# Patient Record
Sex: MALE | Race: WHITE | Employment: UNEMPLOYED | URBAN - METROPOLITAN AREA
[De-identification: names, ages, dates, MRNs, and addresses within clinical notes are randomized per-mention and may not be internally consistent; named-entity substitution may affect disease eponyms.]

---

## 2021-03-07 ENCOUNTER — HOSPITAL ENCOUNTER (OUTPATIENT)
Age: 26
Setting detail: OBSERVATION
Discharge: HOME | End: 2021-03-08
Attending: EMERGENCY MEDICINE | Admitting: STUDENT IN AN ORGANIZED HEALTH CARE EDUCATION/TRAINING PROGRAM
Payer: MEDICAID

## 2021-03-07 ENCOUNTER — APPOINTMENT (OUTPATIENT)
Dept: CT IMAGING | Age: 26
End: 2021-03-07
Attending: INTERNAL MEDICINE
Payer: MEDICAID

## 2021-03-07 ENCOUNTER — APPOINTMENT (OUTPATIENT)
Dept: GENERAL RADIOLOGY | Age: 26
End: 2021-03-07
Attending: EMERGENCY MEDICINE
Payer: MEDICAID

## 2021-03-07 ENCOUNTER — APPOINTMENT (OUTPATIENT)
Dept: CT IMAGING | Age: 26
End: 2021-03-07
Attending: EMERGENCY MEDICINE
Payer: MEDICAID

## 2021-03-07 DIAGNOSIS — B34.9 VIRAL SYNDROME: ICD-10-CM

## 2021-03-07 DIAGNOSIS — K75.9 HEPATITIS: ICD-10-CM

## 2021-03-07 DIAGNOSIS — I16.0 HYPERTENSIVE URGENCY: Primary | ICD-10-CM

## 2021-03-07 PROBLEM — U07.1 COVID-19: Status: ACTIVE | Noted: 2021-03-07

## 2021-03-07 PROBLEM — D72.820 LYMPHOCYTOSIS: Status: ACTIVE | Noted: 2021-03-07

## 2021-03-07 LAB
ALBUMIN SERPL-MCNC: 3.4 G/DL (ref 3.5–5)
ALBUMIN/GLOB SERPL: 0.9 {RATIO} (ref 1.1–2.2)
ALP SERPL-CCNC: 106 U/L (ref 45–117)
ALT SERPL-CCNC: 141 U/L (ref 12–78)
AMPHET UR QL SCN: NEGATIVE
ANION GAP SERPL CALC-SCNC: 11 MMOL/L (ref 5–15)
APPEARANCE UR: CLEAR
AST SERPL-CCNC: 107 U/L (ref 15–37)
B PERT DNA SPEC QL NAA+PROBE: NOT DETECTED
BACTERIA URNS QL MICRO: NEGATIVE /HPF
BARBITURATES UR QL SCN: NEGATIVE
BASOPHILS # BLD: 0 K/UL (ref 0–0.1)
BASOPHILS NFR BLD: 0 % (ref 0–1)
BENZODIAZ UR QL: NEGATIVE
BILIRUB SERPL-MCNC: 1.2 MG/DL (ref 0.2–1)
BILIRUB UR QL CFM: NEGATIVE
BNP SERPL-MCNC: 44 PG/ML (ref 0–125)
BORDETELLA PARAPERTUSSIS PCR, BORPAR: NOT DETECTED
BUN SERPL-MCNC: 11 MG/DL (ref 6–20)
BUN/CREAT SERPL: 9 (ref 12–20)
C PNEUM DNA SPEC QL NAA+PROBE: NOT DETECTED
CALCIUM SERPL-MCNC: 8.5 MG/DL (ref 8.5–10.1)
CANNABINOIDS UR QL SCN: NEGATIVE
CHLORIDE SERPL-SCNC: 100 MMOL/L (ref 97–108)
CO2 SERPL-SCNC: 27 MMOL/L (ref 21–32)
COCAINE UR QL SCN: NEGATIVE
COLOR UR: ABNORMAL
COMMENT, HOLDF: NORMAL
COVID-19 RAPID TEST, COVR: DETECTED
CREAT SERPL-MCNC: 1.18 MG/DL (ref 0.7–1.3)
CRP SERPL-MCNC: 1.66 MG/DL (ref 0–0.6)
D DIMER PPP FEU-MCNC: 1.38 MG/L FEU (ref 0–0.65)
DEPRECATED S PYO AG THROAT QL EIA: NEGATIVE
DIFFERENTIAL METHOD BLD: ABNORMAL
DRUG SCRN COMMENT,DRGCM: NORMAL
EOSINOPHIL # BLD: 0 K/UL (ref 0–0.4)
EOSINOPHIL NFR BLD: 0 % (ref 0–7)
EPITH CASTS URNS QL MICRO: ABNORMAL /LPF
ERYTHROCYTE [DISTWIDTH] IN BLOOD BY AUTOMATED COUNT: 13 % (ref 11.5–14.5)
ERYTHROCYTE [SEDIMENTATION RATE] IN BLOOD: 8 MM/HR (ref 0–15)
FERRITIN SERPL-MCNC: 474 NG/ML (ref 26–388)
FIBRINOGEN PPP-MCNC: 283 MG/DL (ref 200–475)
FLUAV AG NPH QL IA: NEGATIVE
FLUAV H1 2009 PAND RNA SPEC QL NAA+PROBE: NOT DETECTED
FLUAV H1 RNA SPEC QL NAA+PROBE: NOT DETECTED
FLUAV H3 RNA SPEC QL NAA+PROBE: NOT DETECTED
FLUAV SUBTYP SPEC NAA+PROBE: NOT DETECTED
FLUBV AG NOSE QL IA: NEGATIVE
FLUBV RNA SPEC QL NAA+PROBE: NOT DETECTED
GLOBULIN SER CALC-MCNC: 3.6 G/DL (ref 2–4)
GLUCOSE SERPL-MCNC: 127 MG/DL (ref 65–100)
GLUCOSE UR STRIP.AUTO-MCNC: NEGATIVE MG/DL
HADV DNA SPEC QL NAA+PROBE: NOT DETECTED
HCOV 229E RNA SPEC QL NAA+PROBE: NOT DETECTED
HCOV HKU1 RNA SPEC QL NAA+PROBE: NOT DETECTED
HCOV NL63 RNA SPEC QL NAA+PROBE: NOT DETECTED
HCOV OC43 RNA SPEC QL NAA+PROBE: NOT DETECTED
HCT VFR BLD AUTO: 40.9 % (ref 36.6–50.3)
HETEROPH AB BLD QL IA: POSITIVE
HGB BLD-MCNC: 14.8 G/DL (ref 12.1–17)
HGB UR QL STRIP: ABNORMAL
HMPV RNA SPEC QL NAA+PROBE: NOT DETECTED
HPIV1 RNA SPEC QL NAA+PROBE: NOT DETECTED
HPIV2 RNA SPEC QL NAA+PROBE: NOT DETECTED
HPIV3 RNA SPEC QL NAA+PROBE: NOT DETECTED
HPIV4 RNA SPEC QL NAA+PROBE: NOT DETECTED
HYALINE CASTS URNS QL MICRO: ABNORMAL /LPF (ref 0–5)
IMM GRANULOCYTES # BLD AUTO: 0 K/UL
IMM GRANULOCYTES NFR BLD AUTO: 0 %
INR PPP: 1 (ref 0.9–1.1)
KETONES UR QL STRIP.AUTO: 15 MG/DL
LACTATE SERPL-SCNC: 1.3 MMOL/L (ref 0.4–2)
LDH SERPL L TO P-CCNC: 556 U/L (ref 85–241)
LEUKOCYTE ESTERASE UR QL STRIP.AUTO: NEGATIVE
LIPASE SERPL-CCNC: 74 U/L (ref 73–393)
LYMPHOCYTES # BLD: 10 K/UL (ref 0.8–3.5)
LYMPHOCYTES NFR BLD: 70 % (ref 12–49)
M PNEUMO DNA SPEC QL NAA+PROBE: NOT DETECTED
MCH RBC QN AUTO: 31 PG (ref 26–34)
MCHC RBC AUTO-ENTMCNC: 36.2 G/DL (ref 30–36.5)
MCV RBC AUTO: 85.6 FL (ref 80–99)
METHADONE UR QL: NEGATIVE
MONOCYTES # BLD: 0.1 K/UL (ref 0–1)
MONOCYTES NFR BLD: 1 % (ref 5–13)
NEUTS SEG # BLD: 4.1 K/UL (ref 1.8–8)
NEUTS SEG NFR BLD: 29 % (ref 32–75)
NITRITE UR QL STRIP.AUTO: NEGATIVE
NRBC # BLD: 0 K/UL (ref 0–0.01)
NRBC BLD-RTO: 0 PER 100 WBC
OPIATES UR QL: NEGATIVE
PATH REV BLD -IMP: NORMAL
PCP UR QL: NEGATIVE
PH UR STRIP: 6 [PH] (ref 5–8)
PLATELET # BLD AUTO: 363 K/UL (ref 150–400)
PMV BLD AUTO: 9.7 FL (ref 8.9–12.9)
POTASSIUM SERPL-SCNC: 3.6 MMOL/L (ref 3.5–5.1)
PROCALCITONIN SERPL-MCNC: 0.3 NG/ML
PROT SERPL-MCNC: 7 G/DL (ref 6.4–8.2)
PROT UR STRIP-MCNC: 100 MG/DL
PROTHROMBIN TIME: 10.4 SEC (ref 9–11.1)
RBC # BLD AUTO: 4.78 M/UL (ref 4.1–5.7)
RBC #/AREA URNS HPF: ABNORMAL /HPF (ref 0–5)
RBC MORPH BLD: ABNORMAL
RSV RNA SPEC QL NAA+PROBE: NOT DETECTED
RV+EV RNA SPEC QL NAA+PROBE: NOT DETECTED
SAMPLES BEING HELD,HOLD: NORMAL
SARS-COV-2 PCR, COVPCR: NOT DETECTED
SODIUM SERPL-SCNC: 138 MMOL/L (ref 136–145)
SOURCE, COVRS: ABNORMAL
SP GR UR REFRACTOMETRY: 1.01 (ref 1–1.03)
TROPONIN I SERPL-MCNC: <0.05 NG/ML
TSH SERPL DL<=0.05 MIU/L-ACNC: 0.86 UIU/ML (ref 0.36–3.74)
UA: UC IF INDICATED,UAUC: ABNORMAL
UROBILINOGEN UR QL STRIP.AUTO: 2 EU/DL (ref 0.2–1)
WBC # BLD AUTO: 14.2 K/UL (ref 4.1–11.1)
WBC CASTS URNS QL MICRO: ABNORMAL /LPF
WBC MORPH BLD: ABNORMAL
WBC URNS QL MICRO: ABNORMAL /HPF (ref 0–4)

## 2021-03-07 PROCEDURE — 83690 ASSAY OF LIPASE: CPT

## 2021-03-07 PROCEDURE — 80053 COMPREHEN METABOLIC PANEL: CPT

## 2021-03-07 PROCEDURE — 74011250637 HC RX REV CODE- 250/637: Performed by: INTERNAL MEDICINE

## 2021-03-07 PROCEDURE — 84145 PROCALCITONIN (PCT): CPT

## 2021-03-07 PROCEDURE — 93005 ELECTROCARDIOGRAM TRACING: CPT

## 2021-03-07 PROCEDURE — 86308 HETEROPHILE ANTIBODY SCREEN: CPT

## 2021-03-07 PROCEDURE — 85652 RBC SED RATE AUTOMATED: CPT

## 2021-03-07 PROCEDURE — 87389 HIV-1 AG W/HIV-1&-2 AB AG IA: CPT

## 2021-03-07 PROCEDURE — 83605 ASSAY OF LACTIC ACID: CPT

## 2021-03-07 PROCEDURE — 85384 FIBRINOGEN ACTIVITY: CPT

## 2021-03-07 PROCEDURE — 86140 C-REACTIVE PROTEIN: CPT

## 2021-03-07 PROCEDURE — 85610 PROTHROMBIN TIME: CPT

## 2021-03-07 PROCEDURE — 87804 INFLUENZA ASSAY W/OPTIC: CPT

## 2021-03-07 PROCEDURE — 99285 EMERGENCY DEPT VISIT HI MDM: CPT

## 2021-03-07 PROCEDURE — 82728 ASSAY OF FERRITIN: CPT

## 2021-03-07 PROCEDURE — 36415 COLL VENOUS BLD VENIPUNCTURE: CPT

## 2021-03-07 PROCEDURE — 71275 CT ANGIOGRAPHY CHEST: CPT

## 2021-03-07 PROCEDURE — 81001 URINALYSIS AUTO W/SCOPE: CPT

## 2021-03-07 PROCEDURE — 99218 HC RM OBSERVATION: CPT

## 2021-03-07 PROCEDURE — 80307 DRUG TEST PRSMV CHEM ANLYZR: CPT

## 2021-03-07 PROCEDURE — 86611 BARTONELLA ANTIBODY: CPT

## 2021-03-07 PROCEDURE — 80074 ACUTE HEPATITIS PANEL: CPT

## 2021-03-07 PROCEDURE — 87040 BLOOD CULTURE FOR BACTERIA: CPT

## 2021-03-07 PROCEDURE — 74011250637 HC RX REV CODE- 250/637: Performed by: STUDENT IN AN ORGANIZED HEALTH CARE EDUCATION/TRAINING PROGRAM

## 2021-03-07 PROCEDURE — 85379 FIBRIN DEGRADATION QUANT: CPT

## 2021-03-07 PROCEDURE — 83880 ASSAY OF NATRIURETIC PEPTIDE: CPT

## 2021-03-07 PROCEDURE — 87880 STREP A ASSAY W/OPTIC: CPT

## 2021-03-07 PROCEDURE — 85025 COMPLETE CBC W/AUTO DIFF WBC: CPT

## 2021-03-07 PROCEDURE — 96360 HYDRATION IV INFUSION INIT: CPT

## 2021-03-07 PROCEDURE — 74011250636 HC RX REV CODE- 250/636: Performed by: EMERGENCY MEDICINE

## 2021-03-07 PROCEDURE — 74011000636 HC RX REV CODE- 636

## 2021-03-07 PROCEDURE — 83615 LACTATE (LD) (LDH) ENZYME: CPT

## 2021-03-07 PROCEDURE — 87070 CULTURE OTHR SPECIMN AEROBIC: CPT

## 2021-03-07 PROCEDURE — 74176 CT ABD & PELVIS W/O CONTRAST: CPT

## 2021-03-07 PROCEDURE — 87635 SARS-COV-2 COVID-19 AMP PRB: CPT

## 2021-03-07 PROCEDURE — 84484 ASSAY OF TROPONIN QUANT: CPT

## 2021-03-07 PROCEDURE — 71045 X-RAY EXAM CHEST 1 VIEW: CPT

## 2021-03-07 PROCEDURE — 70450 CT HEAD/BRAIN W/O DYE: CPT

## 2021-03-07 PROCEDURE — 84443 ASSAY THYROID STIM HORMONE: CPT

## 2021-03-07 PROCEDURE — 0202U NFCT DS 22 TRGT SARS-COV-2: CPT

## 2021-03-07 RX ORDER — SODIUM CHLORIDE 0.9 % (FLUSH) 0.9 %
5-40 SYRINGE (ML) INJECTION EVERY 8 HOURS
Status: DISCONTINUED | OUTPATIENT
Start: 2021-03-07 | End: 2021-03-08 | Stop reason: HOSPADM

## 2021-03-07 RX ORDER — ZINC SULFATE 50(220)MG
1 CAPSULE ORAL DAILY
Status: DISCONTINUED | OUTPATIENT
Start: 2021-03-08 | End: 2021-03-08

## 2021-03-07 RX ORDER — ACETAMINOPHEN 650 MG/1
650 SUPPOSITORY RECTAL
Status: DISCONTINUED | OUTPATIENT
Start: 2021-03-07 | End: 2021-03-08 | Stop reason: HOSPADM

## 2021-03-07 RX ORDER — GUAIFENESIN 600 MG/1
600 TABLET, EXTENDED RELEASE ORAL EVERY 12 HOURS
Status: DISCONTINUED | OUTPATIENT
Start: 2021-03-07 | End: 2021-03-08 | Stop reason: HOSPADM

## 2021-03-07 RX ORDER — SODIUM CHLORIDE 0.9 % (FLUSH) 0.9 %
5-40 SYRINGE (ML) INJECTION AS NEEDED
Status: DISCONTINUED | OUTPATIENT
Start: 2021-03-07 | End: 2021-03-08 | Stop reason: HOSPADM

## 2021-03-07 RX ORDER — HYDRALAZINE HYDROCHLORIDE 20 MG/ML
10 INJECTION INTRAMUSCULAR; INTRAVENOUS
Status: DISCONTINUED | OUTPATIENT
Start: 2021-03-07 | End: 2021-03-08 | Stop reason: HOSPADM

## 2021-03-07 RX ORDER — ONDANSETRON 2 MG/ML
4 INJECTION INTRAMUSCULAR; INTRAVENOUS
Status: DISCONTINUED | OUTPATIENT
Start: 2021-03-07 | End: 2021-03-08 | Stop reason: HOSPADM

## 2021-03-07 RX ORDER — ACETAMINOPHEN 325 MG/1
650 TABLET ORAL
Status: DISCONTINUED | OUTPATIENT
Start: 2021-03-07 | End: 2021-03-08 | Stop reason: HOSPADM

## 2021-03-07 RX ORDER — ASCORBIC ACID 500 MG
500 TABLET ORAL 2 TIMES DAILY
Status: DISCONTINUED | OUTPATIENT
Start: 2021-03-07 | End: 2021-03-08

## 2021-03-07 RX ORDER — LISINOPRIL 5 MG/1
10 TABLET ORAL
Status: COMPLETED | OUTPATIENT
Start: 2021-03-07 | End: 2021-03-07

## 2021-03-07 RX ORDER — SODIUM CHLORIDE 0.9 % (FLUSH) 0.9 %
5-40 SYRINGE (ML) INJECTION AS NEEDED
Status: DISCONTINUED | OUTPATIENT
Start: 2021-03-07 | End: 2021-03-08 | Stop reason: SDUPTHER

## 2021-03-07 RX ORDER — SODIUM CHLORIDE 0.9 % (FLUSH) 0.9 %
5-40 SYRINGE (ML) INJECTION EVERY 8 HOURS
Status: DISCONTINUED | OUTPATIENT
Start: 2021-03-07 | End: 2021-03-08 | Stop reason: SDUPTHER

## 2021-03-07 RX ORDER — POLYETHYLENE GLYCOL 3350 17 G/17G
17 POWDER, FOR SOLUTION ORAL DAILY PRN
Status: DISCONTINUED | OUTPATIENT
Start: 2021-03-07 | End: 2021-03-08 | Stop reason: HOSPADM

## 2021-03-07 RX ORDER — ENOXAPARIN SODIUM 100 MG/ML
40 INJECTION SUBCUTANEOUS DAILY
Status: DISCONTINUED | OUTPATIENT
Start: 2021-03-08 | End: 2021-03-08 | Stop reason: HOSPADM

## 2021-03-07 RX ORDER — BUTALBITAL, ACETAMINOPHEN AND CAFFEINE 50; 325; 40 MG/1; MG/1; MG/1
1 TABLET ORAL
Status: DISCONTINUED | OUTPATIENT
Start: 2021-03-07 | End: 2021-03-08 | Stop reason: HOSPADM

## 2021-03-07 RX ORDER — GUAIFENESIN 100 MG/5ML
100 SOLUTION ORAL
Status: DISCONTINUED | OUTPATIENT
Start: 2021-03-07 | End: 2021-03-08 | Stop reason: HOSPADM

## 2021-03-07 RX ORDER — PROMETHAZINE HYDROCHLORIDE 25 MG/1
12.5 TABLET ORAL
Status: DISCONTINUED | OUTPATIENT
Start: 2021-03-07 | End: 2021-03-08 | Stop reason: HOSPADM

## 2021-03-07 RX ORDER — LISINOPRIL 5 MG/1
10 TABLET ORAL DAILY
Status: DISCONTINUED | OUTPATIENT
Start: 2021-03-07 | End: 2021-03-07

## 2021-03-07 RX ORDER — LISINOPRIL 20 MG/1
20 TABLET ORAL DAILY
Status: DISCONTINUED | OUTPATIENT
Start: 2021-03-08 | End: 2021-03-08

## 2021-03-07 RX ADMIN — Medication 10 ML: at 19:14

## 2021-03-07 RX ADMIN — GUAIFENESIN 600 MG: 600 TABLET, EXTENDED RELEASE ORAL at 23:39

## 2021-03-07 RX ADMIN — LISINOPRIL 10 MG: 5 TABLET ORAL at 23:39

## 2021-03-07 RX ADMIN — IOPAMIDOL 80 ML: 755 INJECTION, SOLUTION INTRAVENOUS at 15:19

## 2021-03-07 RX ADMIN — BUTALBITAL, ACETAMINOPHEN, AND CAFFEINE 1 TABLET: 50; 325; 40 TABLET ORAL at 23:39

## 2021-03-07 RX ADMIN — ACETAMINOPHEN 650 MG: 325 TABLET ORAL at 19:18

## 2021-03-07 RX ADMIN — OXYCODONE HYDROCHLORIDE AND ACETAMINOPHEN 500 MG: 500 TABLET ORAL at 19:14

## 2021-03-07 RX ADMIN — LISINOPRIL 10 MG: 5 TABLET ORAL at 19:13

## 2021-03-07 RX ADMIN — SODIUM CHLORIDE 1000 ML: 9 INJECTION, SOLUTION INTRAVENOUS at 15:49

## 2021-03-07 NOTE — ED NOTES
Patient presents to ED with c/o cough after testing positive for Covid over 1-month ago. At the time of his diagnosis he had minimal symptoms which included fever and body aches. Now for the last 1 to 2 weeks he has developed recurrent fatigue loss of appetite dyspnea on exertion with walking across the room flank pain and body aches. He does have a history of polycystic kidneys but denies any complications related to that. His blood pressure is noted to be elevated on arrival and he denies history of that but he states there is a family history of premature hypertension.         Patient is alert and oriented x 4 and in no acute distress at this time. Respirations are at a regular rate, depth, and pattern. Patient updated on plan of care and has no questions or concerns at this time. Call bell within reach. Will continue to monitor. Please reference nursing assessment. Emergency Department Nursing Plan of Care       The Nursing Plan of Care is developed from the Nursing assessment and Emergency Department Attending provider initial evaluation. The plan of care may be reviewed in the ED Provider note.     The Plan of Care was developed with the following considerations:   Patient / Family readiness to learn indicated by:verbalized understanding and successful return demonstration  Persons(s) to be included in education: patient  Barriers to Learning/Limitations:No    Signed     Bridget YOON Jimenez    3/7/2021   2:45 PM

## 2021-03-07 NOTE — PROGRESS NOTES
TRANSFER - IN REPORT:    Verbal report received from Nickie Griffith rn(name) on [facility]  being received from ED(unit) for routine progression of care      Report consisted of patients Situation, Background, Assessment and   Recommendations(SBAR). Information from the following report(s) SBAR, Kardex, ED Summary, Intake/Output, MAR, Accordion, Recent Results, Med Rec Status and Cardiac Rhythm NSR was reviewed with the receiving nurse. Opportunity for questions and clarification was provided. Assessment completed upon patients arrival to unit and care assumed. 1830 pt arrived on floor via stretcher. pt ambulate to his bed. pt awake,alert with steady gait. pt on RA.call bell in reach,side rails up X 3.pt provided with dinner tray. vital sign checked and continue care assumed. [facility] consulted to clarify PCR nasal swab order. new order received. 1920 blood drawn and nasal swab collected and sent to lab.     1930 shift change report given to [facility].

## 2021-03-07 NOTE — ED NOTES
Patient updated on plan of care and in agreement with being admitted to hospital for higher level of care. Hospitalist in the ED speaking with patient.

## 2021-03-07 NOTE — ED NOTES
TRANSFER - OUT REPORT:    Verbal report given to YOON Stark(name) on [facility]  being transferred to [facility](unit) for routine progression of care       Report consisted of patients Situation, Background, Assessment and   Recommendations(SBAR). Information from the following report(s) SBAR, Kardex, ED Summary, [facility] and Recent Results was reviewed with the receiving nurse. Lines: 20 gauge RAC  Peripheral IV 03/07/21 Right Antecubital (Active)   Site Assessment Clean, dry, & intact 03/07/21 1408   Phlebitis Assessment 0 03/07/21 1408   Infiltration Assessment 0 03/07/21 1408   Dressing Status Clean, dry, & intact 03/07/21 1408   Dressing Type Tape;Transparent 03/07/21 1408   Hub Color/Line Status Pink;Patent; Flushed 03/07/21 1408        Opportunity for questions and clarification was provided.       Patient transported with:   Monitor  Patient-specific medications from Pharmacy  Registered Nurse

## 2021-03-07 NOTE — ED NOTES
Patient resting in bed comfortably and in no acute distress. IV fluids infusing without difficulty. Patient updated on plan of care.

## 2021-03-07 NOTE — ED PROVIDER NOTES
EMERGENCY DEPARTMENT HISTORY AND PHYSICAL EXAM      Date: 3/7/2021  Patient Name: [facility]    History of Presenting Illness     Chief Complaint   Patient presents with    Cough     +covid 1 month ago, FATIGUE with 15lb wgt loss after covid    Flank Pain     bilateral x2-3 days, hx of kidney problems       History Provided By: Patient    HPI: [facility], 32 y.o. male presents to the ED with cc of Chavies and shortness of breath with exertion. Patient is a recent college graduate who was diagnosed with Covid over 1-month ago. At the time of his diagnosis he had minimal symptoms which included fever and body aches. He did not have significant respiratory symptoms other than a mild cough he seemed to get better after a week. Now for the last 1 to 2 weeks he has developed recurrent fatigue loss of appetite dyspnea on exertion with walking across the room flank pain and body aches. He denies any obvious reexposures to COVID-19. He denies illicit drug use, tobacco use and only drinks alcohol occasionally. He does have a history of polycystic kidneys but denies any complications related to that. His blood pressure is noted to be elevated on arrival and he denies history of that but he states there is a family history of premature hypertension. He is not on any new medications and denies any over-the-counter medications. He denies pleurisy and has had no history of DVT or pulmonary embolism. There are no other complaints, changes, or physical findings at this time. PCP: None    No current facility-administered medications on file prior to encounter. No current outpatient medications on file prior to encounter. Past History     Past Medical History:  History reviewed. No pertinent past medical history. Past Surgical History:  No past surgical history on file. Family History:  History reviewed. No pertinent family history.     Social History:  Social History     Tobacco Use    Smoking status: Not on file   Substance Use Topics    Alcohol use: Not on file    Drug use: Not on file       Allergies:  No Known Allergies      Review of Systems   Review of Systems   Constitutional: Positive for chills and fatigue. Negative for fever. HENT: Positive for sore throat. Negative for congestion, rhinorrhea and sinus pressure. Eyes: Negative. Negative for discharge and redness. Respiratory: Positive for shortness of breath. Negative for chest tightness. Cardiovascular: Negative. Negative for chest pain and leg swelling. Gastrointestinal: Negative. Negative for abdominal pain and blood in stool. Endocrine: Negative. Genitourinary: Positive for flank pain. Negative for hematuria. Musculoskeletal: Negative for back pain. Skin: Negative. Negative for rash. Neurological: Negative. Negative for dizziness, seizures, weakness, numbness and headaches. Hematological: Negative. All other systems reviewed and are negative. Physical Exam   Physical Exam  Vitals signs and nursing note reviewed. Constitutional:       General: He is not in acute distress. Appearance: He is normal weight. He is ill-appearing and diaphoretic. HENT:      Head: Normocephalic and atraumatic. Right Ear: Tympanic membrane normal.      Left Ear: Tympanic membrane normal.      Nose: Nose normal.      Mouth/Throat:      Mouth: Mucous membranes are moist.      Pharynx: Posterior oropharyngeal erythema present. Eyes:      General:         Right eye: No discharge. Left eye: No discharge. Extraocular Movements: Extraocular movements intact. Conjunctiva/sclera: Conjunctivae normal.      Pupils: Pupils are equal, round, and reactive to light. Neck:      Musculoskeletal: Normal range of motion. Cardiovascular:      Rate and Rhythm: Tachycardia present. Pulmonary:      Effort: Pulmonary effort is normal. No respiratory distress. Breath sounds: Normal breath sounds.  No stridor. No wheezing, rhonchi or rales. Chest:      Chest wall: No tenderness. Abdominal:      General: Abdomen is flat. Tenderness: There is no abdominal tenderness. There is no right CVA tenderness or left CVA tenderness. Musculoskeletal: Normal range of motion. Skin:     General: Skin is warm. Capillary Refill: Capillary refill takes less than 2 seconds. Findings: No rash. Neurological:      General: No focal deficit present. Mental Status: He is oriented to person, place, and time. Mental status is at baseline. Cranial Nerves: No cranial nerve deficit. Motor: No weakness. 3:50 p.m.-patient notes that he has a new significant other. He has never had HIV that he is aware of. Still awaiting CT. Discussed case with radiologist Dr Elena James who feels patient is a candidate for the CT with contrast as he has a normal GFR no sequela related to the polycystic kidneys. I did discuss this with the patient made him aware of the potential risk of contrast at baseline for any healthy adult he agrees that the risk of the test versus performing it is important and he wants to go forward with the test.    4:50 PM-reviewed results with patient. Still have concerns as he states he feels generally fatigued and has noted that when he walks across the parking lot he is very short of breath. The [facility] came by was very concerned about the patient's blood smear showing a severe infectious process. 1705 p.m.-consult with the hospitalist Dr. Onur Bower. He has concerns about a possible viral process which could be related to hepatitis or HIV. He doubts this represents a Covid process due to duration of symptoms and remoteness from his initial infection. He agrees to admit the patient for further evaluation and observation. When discussing this with the patient he was very fearful of going home  and agreed with standing.     Diagnostic Study Results     Labs - No results found for this or any previous visit (from the past 12 hour(s)). Radiologic Studies -   CT HEAD WO CONT   Final Result      No acute intracranial abnormality. CT ABD PELV WO CONT   Final Result      1. Enlarged kidneys with multiple cysts most compatible with autosomal dominant   polycystic kidney disease. 2. Hepatic steatosis. CTA CHEST W OR W WO CONT   Final Result   No evidence for pulmonary embolism. XR CHEST PORT   Final Result   Normal chest.        CT Results  (Last 48 hours)    None        CXR Results  (Last 48 hours)    None          Medical Decision Making   I am the first provider for this patient. I reviewed the vital signs, available nursing notes, past medical history, past surgical history, family history and social history. Vital Signs-Reviewed the patient's vital signs. No data found. EKG interpretation: (Preliminary)  Rhythm: normal sinus rhythm; and regular . Rate (approx.): 90; Axis: right axis deviation; OR interval: normal; QRS interval: normal ; ST/T wave: Inferior T wave abnormality; Other findings: abnormal ekg. Records Reviewed: Nursing Notes and Old Medical Records    Provider Notes (Medical Decision Making):   Parenteral diagnosis-post Covid complication, cardiomyopathy, post strep glomerulonephritis, new onset hypertension, pneumonia, PE/DVT, renal vein thrombosis, post Covid syndrome, HIV, hepatitis    Impression/plan-32year-old male with history of polycystic kidneys to the ER with recurrent respiratory symptoms including dyspnea on exertion and fatigue. He was diagnosed with Covid over a month ago and seemed to convalesce appropriately. Since then he has had fatigue, flank pain has had notice of his urine turning dark. In the ER his blood pressure is significantly elevated. He has no history of hypertension. Plan will be to check blood work, EKG and chest x-ray.   Patient may need CT or echocardiogram depending on salts of the initial test.    ED Course:   Initial assessment performed. The patients presenting problems have been discussed, and they are in agreement with the care plan formulated and outlined with them. I have encouraged them to ask questions as they arise throughout their visit. Sophy Weldon MD      Disposition:    Admitted to Floor Medical Floor the case was discussed with the admitting physician Feliciano      DISCHARGE PLAN:  1. Discharge Medication List as of 3/8/2021  3:27 PM      START taking these medications    Details   guaiFENesin (ROBITUSSIN) 100 mg/5 mL liquid Take 5 mL by mouth three (3) times daily as needed for Cough., Normal, Disp-1 Bottle, R-0      lisinopriL (PRINIVIL, ZESTRIL) 40 mg tablet Take 1 Tab by mouth daily. , Normal, Disp-30 Tab, R-1      benzonatate (Tessalon Perles) 100 mg capsule Take 1 Cap by mouth three (3) times daily as needed for Cough for up to 10 days. , Normal, Disp-30 Cap, R-0           2. Follow-up Information     Follow up With Specialties Details Why [facility] On 3/30/2021 Your appointment time is 8am.  This will be an in person appointment. There is a $50 co-pay., Bring ins card, picture id, and discharge papers, Please keep this appointment, Please arrive 15 minutes early. [address] [place]  [phone]    [facility]  On 3/10/2021 Case Management has called office and left a message to return call. If you do not hear back about day/time for appointment by Wednesday, March 10 at 12 noon, please call office to schedule appointment. [address], [address] [place]    Phone: [phone]                                                                    [facility] On 3/9/2021 [facility] will call you Tuesday morning to scheduled a time to come and see you at your apartment.  [id number] [facility] [id number]  [phone]        3. Return to ED if worse     Diagnosis     Clinical Impression:   1. Hypertensive urgency    2. Hepatitis    3. Viral syndrome        Attestations:    Sophy Lindsey MD    Please note that this dictation was completed with [email or web address], the computer voice recognition software. Quite often unanticipated grammatical, syntax, homophones, and other interpretive errors are inadvertently transcribed by the computer software. Please disregard these errors. Please excuse any errors that have escaped final proofreading. Thank you.

## 2021-03-07 NOTE — H&P
Hospitalist Admission Note    NAME: [facility]   :  1995   MRN:  [id number]   Room Number: DJ26/25  @ [facility]     Date/Time:  3/7/2021 5:17 PM    Patient PCP: None  ______________________________________________________________________  Given the patient's current clinical presentation, I have a high level of concern for decompensation if discharged from the emergency department. Complex decision making was performed, which includes reviewing the patient's available past medical records, laboratory results, and x-ray films. My assessment of this patient's clinical condition and my plan of care is as follows. Assessment / Plan:        Active Problems:    Lymphocytosis (3/7/2021)      COVID-19 (3/7/2021)      #Fatigue and dyspnea as sequela of COVID-19  -Patient was positive for COVID-19 early February of this year  -Patient main symptom were myalgias cough and fatigue  -Fatigue continue to bother along with poor appetite  -Discussed with patient that it takes 3 months or longer for the symptoms to resolve  -Patient did not feel comfortable going home so we will observe her overnight and discharge him in the morning everything remains stable    -We will give symptomatic treatment with albuterol ipratropium as needed for shortness of breath  -Cough suppressant  -Mucolytic agents  -Vitamin C and zinc  -Lovenox  -Echocardiogram in the morning  -Check for inflammatory markers    #Lymphocytic lymphocytosis  # Sepsis   -WBC 14.2, heart rate 103 on admission  -Suspect secondary to viral infection  --Blood culture pending  -Chest x-ray normal  -CT scan chest and pelvis without any acute source of infection  -- Urinalysis without any evidence of infection-   -  -We will check for HIV / hepatitis B and C  -Check respiratory viral panel including COVID-19 PCR  -Check Bartonella  antibodies  - No fever / Hold off on giving antibiotic  -Trend WBC    #APKD  -Serum creatinine 1.18  -UA with small blood and proteinuria  -Initiate lisinopril 10 mg  -Need nephrology follow-up for tolvaptan    #Elevated liver enzymes  -  alk phos 106  -CT scan abdomen pelvis reviewed independently CBD normal gallbladder normal  -Check hepatitis labs  -Trend LFTs    #Sinus tachycardia  -EKG reviewed independently sinus tach nonspecific T wave changes  -Suspect secondary to lymph lymphocytosis from COVID-19  -Check echo in the morning  -Telemetry    #Weight loss  -15 pound  -Suspect secondary to loss of appetite sequela of COVID-19  - peripheral smear pending? Malignancy less likely            Body mass index is 25.55 kg/m². Code Status: Full   Surrogate Decision Maker:    DVT Prophylaxis: Lovenox  GI Prophylaxis: not indicated        Subjective:   CHIEF COMPLAINT: Fatigue, dyspnea and weight loss    HISTORY OF PRESENT ILLNESS:     Olive Delaney is a 32 y.o.  male with PMH of above mentioned problems who presents to ED with c/o fatigue, loss of appetite, dyspnea and weight loss for past 1 month. Patient states that he has COVID-19 infection earlier February and since then he been feeling weak short of breath on exertion and loss his appetite. Patient denied any fever but endorsed some chills. Patient stated that he moved from [place] to [place] and since then has been having those symptoms. Patient recently had a new sexual partner and is unaware of the HIV state of the partner but he said he has been monogamous. Patient denied any recent travels or any other states, any sick pets. Patient endorsed dry cough. Drinks alcohol occasionally denies any history of tobacco use or drug use    We were asked to admit for work up and evaluation of the above problems. History reviewed. No pertinent past medical history. No past surgical history on file.     Social History     Tobacco Use    Smoking status: Not on file   Substance Use Topics    Alcohol use: Not on file History reviewed. No pertinent family history. No Known Allergies     Prior to Admission medications    Not on File       REVIEW OF SYSTEMS:     I am not able to complete the review of systems because: The patient is intubated and sedated    The patient has altered mental status due to his acute medical problems    The patient has baseline aphasia from prior stroke(s)    The patient has baseline dementia and is not reliable historian    The patient is in acute medical distress and unable to provide information           Total of 12 systems reviewed as follows:       POSITIVE= underlined text  Negative = text not underlined  General:  fever, chills, sweats, generalized weakness, weight loss/gain,      loss of appetite   Eyes:    blurred vision, eye pain, loss of vision, double vision  ENT:    rhinorrhea, pharyngitis   Respiratory:   cough, sputum production, SOB, HILL, wheezing, pleuritic pain   Cardiology:   chest pain, palpitations, orthopnea, PND, edema, syncope   Gastrointestinal:  abdominal pain , N/V, diarrhea, dysphagia, constipation, bleeding   Genitourinary:  frequency, urgency, dysuria, hematuria, incontinence   Muskuloskeletal :  arthralgia, myalgia, back pain  Hematology:  easy bruising, nose or gum bleeding, lymphadenopathy   Dermatological: rash, ulceration, pruritis, color change / jaundice  Endocrine:   hot flashes or polydipsia   Neurological:  headache, dizziness, confusion, focal weakness, paresthesia,     Speech difficulties, memory loss, gait difficulty  Psychological: Feelings of anxiety, depression, agitation    Objective:   VITALS:    Visit Vitals  BP (!) 167/109 (BP 1 Location: Right upper arm, BP Patient Position: At rest)   Pulse (!) 103   Temp 97.6 °F (36.4 °C)   Resp 19   Ht 5' 9\" (1.753 m)   Wt 78.5 kg (173 lb)   SpO2 98%   BMI 25.55 kg/m²       PHYSICAL EXAM:    General:    Alert, cooperative, no distress, appears stated age.      HEENT: Atraumatic, anicteric sclerae, pink conjunctivae     No oral ulcers, mucosa moist, throat clear, dentition fair  Neck:  Supple, symmetrical,  thyroid: non tender  Lungs:   Clear to auscultation bilaterally. No Wheezing or Rhonchi. No rales. Chest wall:  No tenderness  No Accessory muscle use. Heart:   Regular  rhythm,  No  murmur   No edema  Abdomen:   Soft, non-tender. Not distended. Bowel sounds normal  Extremities: No cyanosis. No clubbing,      Skin turgor normal, Capillary refill normal, Radial dial pulse 2+  Skin:     Not pale. Not Jaundiced  No rashes   Psych:  Good insight. Not depressed. Not anxious or agitated. Neurologic: EOMs intact. No facial asymmetry. No aphasia or slurred speech. Symmetrical strength, Sensation grossly intact. Alert and oriented X 4.     ______________________________________________________________________    Care Plan discussed with:  Patient/Family    Expected  Disposition:  Home w/Family  ________________________________________________________________________  TOTAL TIME:  25 Minutes    Critical Care Provided     Minutes non procedure based      Comments    x Reviewed previous records   >50% of visit spent in counseling and coordination of care x Discussion with patient and/or family and questions answered       ________________________________________________________________________  Signed: Michelle Hood MD    Procedures: see electronic medical records for all procedures/Xrays and details which were not copied into this note but were reviewed prior to creation of Plan.     LAB DATA REVIEWED:    Recent Results (from the past 24 hour(s))   CBC WITH AUTOMATED DIFF    Collection Time: 03/07/21  2:06 PM   Result Value Ref Range    WBC 14.2 (H) 4.1 - 11.1 K/uL    RBC 4.78 4.10 - 5.70 M/uL    HGB 14.8 12.1 - 17.0 g/dL    HCT 40.9 36.6 - 50.3 %    MCV 85.6 80.0 - 99.0 FL    MCH 31.0 26.0 - 34.0 PG    MCHC 36.2 30.0 - 36.5 g/dL    RDW 13.0 11.5 - 14.5 %    PLATELET 234 144 - 159 K/uL    MPV 9.7 8.9 - 12.9 FL NRBC 0.0 0  WBC    ABSOLUTE NRBC 0.00 0.00 - 0.01 K/uL    NEUTROPHILS 29 (L) 32 - 75 %    LYMPHOCYTES 70 (H) 12 - 49 %    MONOCYTES 1 (L) 5 - 13 %    EOSINOPHILS 0 0 - 7 %    BASOPHILS 0 0 - 1 %    IMMATURE GRANULOCYTES 0 %    ABS. NEUTROPHILS 4.1 1.8 - 8.0 K/UL    ABS. LYMPHOCYTES 10.0 (H) 0.8 - 3.5 K/UL    ABS. MONOCYTES 0.1 0.0 - 1.0 K/UL    ABS. EOSINOPHILS 0.0 0.0 - 0.4 K/UL    ABS. BASOPHILS 0.0 0.0 - 0.1 K/UL    ABS. IMM. GRANS. 0.0 K/UL    DF MANUAL      RBC COMMENTS NORMOCYTIC, NORMOCHROMIC      WBC COMMENTS REACTIVE LYMPHS     METABOLIC PANEL, COMPREHENSIVE    Collection Time: 03/07/21  2:06 PM   Result Value Ref Range    Sodium 138 136 - 145 mmol/L    Potassium 3.6 3.5 - 5.1 mmol/L    Chloride 100 97 - 108 mmol/L    CO2 27 21 - 32 mmol/L    Anion gap 11 5 - 15 mmol/L    Glucose 127 (H) 65 - 100 mg/dL    BUN 11 6 - 20 MG/DL    Creatinine 1.18 0.70 - 1.30 MG/DL    BUN/Creatinine ratio 9 (L) 12 - 20      GFR est AA >60 >60 ml/min/1.73m2    GFR est non-AA >60 >60 ml/min/1.73m2    Calcium 8.5 8.5 - 10.1 MG/DL    Bilirubin, total 1.2 (H) 0.2 - 1.0 MG/DL    ALT (SGPT) 141 (H) 12 - 78 U/L    AST (SGOT) 107 (H) 15 - 37 U/L    Alk.  phosphatase 106 45 - 117 U/L    Protein, total 7.0 6.4 - 8.2 g/dL    Albumin 3.4 (L) 3.5 - 5.0 g/dL    Globulin 3.6 2.0 - 4.0 g/dL    A-G Ratio 0.9 (L) 1.1 - 2.2     D DIMER    Collection Time: 03/07/21  2:06 PM   Result Value Ref Range    D-dimer 1.38 (H) 0.00 - 0.65 mg/L FEU   LIPASE    Collection Time: 03/07/21  2:06 PM   Result Value Ref Range    Lipase 74 73 - 393 U/L   STREP AG SCREEN, GROUP A    Collection Time: 03/07/21  2:06 PM    Specimen: Serum   Result Value Ref Range    Group A Strep Ag ID Negative NEG     LACTIC ACID    Collection Time: 03/07/21  2:06 PM   Result Value Ref Range    Lactic acid 1.3 0.4 - 2.0 MMOL/L   INFLUENZA A+B VIRAL AGS    Collection Time: 03/07/21  2:06 PM   Result Value Ref Range    Influenza A Antigen Negative NEG      Influenza B Antigen Negative NEG     TSH 3RD GENERATION    Collection Time: 03/07/21  2:06 PM   Result Value Ref Range    TSH 0.86 0.36 - 3.74 uIU/mL   NT-PRO BNP    Collection Time: 03/07/21  2:06 PM   Result Value Ref Range    NT pro-BNP 44 0 - 125 PG/ML   TROPONIN I    Collection Time: 03/07/21  2:06 PM   Result Value Ref Range    Troponin-I, Qt. <0.05 <0.05 ng/mL   LD    Collection Time: 03/07/21  2:06 PM   Result Value Ref Range     (H) 85 - 241 U/L   SED RATE (ESR)    Collection Time: 03/07/21  2:06 PM   Result Value Ref Range    Sed rate, automated 8 0 - 15 mm/hr   EKG, 12 LEAD, INITIAL    Collection Time: 03/07/21  2:10 PM   Result Value Ref Range    Ventricular Rate 90 BPM    Atrial Rate 90 BPM    P-R Interval 150 ms    QRS Duration 104 ms    Q-T Interval 374 ms    QTC Calculation (Bezet) 457 ms    Calculated P Axis 49 degrees    Calculated R Axis 105 degrees    Calculated T Axis -7 degrees    Diagnosis       Normal sinus rhythm  Rightward axis  T wave abnormality, consider inferior ischemia  Abnormal ECG  No previous ECGs available     COVID-19 RAPID TEST    Collection Time: 03/07/21  3:53 PM   Result Value Ref Range    Specimen source Nasopharyngeal      COVID-19 rapid test Detected (AA) NOTD     URINALYSIS W/ REFLEX CULTURE    Collection Time: 03/07/21  4:34 PM    Specimen: Urine    Urine specimen   Result Value Ref Range    Color DARK YELLOW      Appearance CLEAR CLEAR      Specific gravity 1.015 1.003 - 1.030      pH (UA) 6.0 5.0 - 8.0      Protein 100 (A) NEG mg/dL    Glucose Negative NEG mg/dL    Ketone 15 (A) NEG mg/dL    Blood SMALL (A) NEG      Urobilinogen 2.0 (H) 0.2 - 1.0 EU/dL    Nitrites Negative NEG      Leukocyte Esterase Negative NEG      WBC 5-10 0 - 4 /hpf    RBC 10-20 0 - 5 /hpf    Epithelial cells FEW FEW /lpf    Bacteria Negative NEG /hpf    UA:UC IF INDICATED CULTURE NOT INDICATED BY UA RESULT CNI      Hyaline cast 0-2 0 - 5 /lpf    WBC cast 0-2 (A) NEG /lpf   DRUG SCREEN, URINE    Collection Time: 03/07/21  4:34 PM   Result Value Ref Range    AMPHETAMINES Negative NEG      BARBITURATES Negative NEG      BENZODIAZEPINES Negative NEG      COCAINE Negative NEG      METHADONE Negative NEG      OPIATES Negative NEG      PCP(PHENCYCLIDINE) Negative NEG      THC (TH-CANNABINOL) Negative NEG      Drug screen comment (NOTE)    SAMPLES BEING HELD    Collection Time: 03/07/21  4:34 PM   Result Value Ref Range    SAMPLES BEING HELD SST, RED     COMMENT        Add-on orders for these samples will be processed based on acceptable specimen integrity and analyte stability, which may vary by analyte.    BILIRUBIN, CONFIRM    Collection Time: 03/07/21  4:34 PM   Result Value Ref Range    Bilirubin UA, confirm Negative NEG

## 2021-03-08 ENCOUNTER — APPOINTMENT (OUTPATIENT)
Dept: ULTRASOUND IMAGING | Age: 26
End: 2021-03-08
Attending: INTERNAL MEDICINE
Payer: MEDICAID

## 2021-03-08 ENCOUNTER — APPOINTMENT (OUTPATIENT)
Dept: NON INVASIVE DIAGNOSTICS | Age: 26
End: 2021-03-08
Attending: STUDENT IN AN ORGANIZED HEALTH CARE EDUCATION/TRAINING PROGRAM
Payer: MEDICAID

## 2021-03-08 VITALS
BODY MASS INDEX: 25.62 KG/M2 | SYSTOLIC BLOOD PRESSURE: 143 MMHG | OXYGEN SATURATION: 100 % | WEIGHT: 173 LBS | TEMPERATURE: 98.7 F | HEIGHT: 69 IN | DIASTOLIC BLOOD PRESSURE: 92 MMHG | HEART RATE: 89 BPM | RESPIRATION RATE: 18 BRPM

## 2021-03-08 LAB
ALBUMIN SERPL-MCNC: 3.2 G/DL (ref 3.5–5)
ALBUMIN/GLOB SERPL: 0.9 {RATIO} (ref 1.1–2.2)
ALP SERPL-CCNC: 103 U/L (ref 45–117)
ALT SERPL-CCNC: 127 U/L (ref 12–78)
ANION GAP SERPL CALC-SCNC: 12 MMOL/L (ref 5–15)
AST SERPL-CCNC: 85 U/L (ref 15–37)
ATRIAL RATE: 90 BPM
BASOPHILS # BLD: 0 K/UL (ref 0–0.1)
BASOPHILS NFR BLD: 0 % (ref 0–1)
BILIRUB DIRECT SERPL-MCNC: 0.5 MG/DL (ref 0–0.2)
BILIRUB SERPL-MCNC: 1.4 MG/DL (ref 0.2–1)
BUN SERPL-MCNC: 8 MG/DL (ref 6–20)
BUN/CREAT SERPL: 7 (ref 12–20)
CALCIUM SERPL-MCNC: 8.3 MG/DL (ref 8.5–10.1)
CALCULATED P AXIS, ECG09: 49 DEGREES
CALCULATED R AXIS, ECG10: 105 DEGREES
CALCULATED T AXIS, ECG11: -7 DEGREES
CHLORIDE SERPL-SCNC: 103 MMOL/L (ref 97–108)
CO2 SERPL-SCNC: 26 MMOL/L (ref 21–32)
CREAT SERPL-MCNC: 1.19 MG/DL (ref 0.7–1.3)
CRP SERPL-MCNC: 2.02 MG/DL (ref 0–0.6)
D DIMER PPP FEU-MCNC: 1.69 MG/L FEU (ref 0–0.65)
DIAGNOSIS, 93000: NORMAL
DIFFERENTIAL METHOD BLD: ABNORMAL
ECHO AO ROOT DIAM: 2.74 CM
ECHO AV AREA PEAK VELOCITY: 2.9 CM2
ECHO AV AREA PLAN: 3.77 CM2
ECHO AV AREA/BSA PEAK VELOCITY: 1.5 CM2/M2
ECHO AV PEAK GRADIENT: 4.69 MMHG
ECHO AV PEAK VELOCITY: 108.27 CM/S
ECHO EST RA PRESSURE: 5 MMHG
ECHO LA AREA 4C: 13.39 CM2
ECHO LA MAJOR AXIS: 3.41 CM
ECHO LA MINOR AXIS: 1.76 CM
ECHO LA VOL 4C: 35.19 ML (ref 18–58)
ECHO LA VOLUME INDEX A4C: 18.12 ML/M2 (ref 16–28)
ECHO LV EDV A4C: 144.29 ML
ECHO LV EDV INDEX A4C: 74.3 ML/M2
ECHO LV EJECTION FRACTION A4C: 77 PERCENT
ECHO LV ESV A4C: 33.08 ML
ECHO LV ESV INDEX A4C: 17 ML/M2
ECHO LV INTERNAL DIMENSION DIASTOLIC: 4.97 CM (ref 4.2–5.9)
ECHO LV INTERNAL DIMENSION SYSTOLIC: 3.09 CM
ECHO LV IVSD: 0.53 CM (ref 0.6–1)
ECHO LV MASS 2D: 106.5 G (ref 88–224)
ECHO LV MASS INDEX 2D: 54.8 G/M2 (ref 49–115)
ECHO LV POSTERIOR WALL DIASTOLIC: 0.8 CM (ref 0.6–1)
ECHO LVOT DIAM: 1.97 CM
ECHO LVOT PEAK GRADIENT: 4.22 MMHG
ECHO LVOT PEAK VELOCITY: 102.75 CM/S
ECHO MV A VELOCITY: 36.05 CM/S
ECHO MV AREA PHT: 7.64 CM2
ECHO MV AREA PHT: 8.51 CM2
ECHO MV AREA PLAN: 5.81 CM2
ECHO MV E DECELERATION TIME (DT): 99.24 MS
ECHO MV E VELOCITY: 33.22 CM/S
ECHO MV E/A RATIO: 0.92
ECHO MV MAX VELOCITY: 62.9 CM/S
ECHO MV MEAN GRADIENT: 0.58 MMHG
ECHO MV PEAK GRADIENT: 1.58 MMHG
ECHO MV PRESSURE HALF TIME (PHT): 25.87 MS
ECHO MV PRESSURE HALF TIME (PHT): 28.78 MS
ECHO MV VTI: 9.3 CM
ECHO PV PEAK INSTANTANEOUS GRADIENT SYSTOLIC: 3.62 MMHG
ECHO PV REGURGITANT MAX VELOCITY: 95 CM/S
ECHO RA AREA 4C: 12.46 CM2
ECHO RIGHT VENTRICULAR SYSTOLIC PRESSURE (RVSP): 20.54 MMHG
ECHO TV REGURGITANT MAX VELOCITY: 196.29 CM/S
ECHO TV REGURGITANT PEAK GRADIENT: 15.54 MMHG
EOSINOPHIL # BLD: 0 K/UL (ref 0–0.4)
EOSINOPHIL NFR BLD: 0 % (ref 0–7)
ERYTHROCYTE [DISTWIDTH] IN BLOOD BY AUTOMATED COUNT: 13.3 % (ref 11.5–14.5)
GLOBULIN SER CALC-MCNC: 3.4 G/DL (ref 2–4)
GLUCOSE SERPL-MCNC: 104 MG/DL (ref 65–100)
HAV IGM SER QL: NONREACTIVE
HBV CORE IGM SER QL: NONREACTIVE
HBV SURFACE AG SER QL: <0.1 INDEX
HBV SURFACE AG SER QL: NEGATIVE
HCT VFR BLD AUTO: 40.4 % (ref 36.6–50.3)
HCV AB SERPL QL IA: NONREACTIVE
HCV COMMENT,HCGAC: NORMAL
HGB BLD-MCNC: 14.3 G/DL (ref 12.1–17)
HIV 1+2 AB+HIV1 P24 AG SERPL QL IA: NONREACTIVE
HIV12 RESULT COMMENT, HHIVC: NORMAL
IMM GRANULOCYTES # BLD AUTO: 0 K/UL
IMM GRANULOCYTES NFR BLD AUTO: 0 %
LDH SERPL L TO P-CCNC: 468 U/L (ref 85–241)
LYMPHOCYTES # BLD: 8.8 K/UL (ref 0.8–3.5)
LYMPHOCYTES NFR BLD: 61 % (ref 12–49)
MCH RBC QN AUTO: 30.6 PG (ref 26–34)
MCHC RBC AUTO-ENTMCNC: 35.4 G/DL (ref 30–36.5)
MCV RBC AUTO: 86.3 FL (ref 80–99)
MONOCYTES # BLD: 1.3 K/UL (ref 0–1)
MONOCYTES NFR BLD: 9 % (ref 5–13)
NEUTS BAND NFR BLD MANUAL: 1 % (ref 0–6)
NEUTS SEG # BLD: 4.3 K/UL (ref 1.8–8)
NEUTS SEG NFR BLD: 29 % (ref 32–75)
NRBC # BLD: 0 K/UL (ref 0–0.01)
NRBC BLD-RTO: 0 PER 100 WBC
P-R INTERVAL, ECG05: 150 MS
PLATELET # BLD AUTO: 355 K/UL (ref 150–400)
PMV BLD AUTO: 9.6 FL (ref 8.9–12.9)
POTASSIUM SERPL-SCNC: 3.5 MMOL/L (ref 3.5–5.1)
PROT SERPL-MCNC: 6.6 G/DL (ref 6.4–8.2)
Q-T INTERVAL, ECG07: 374 MS
QRS DURATION, ECG06: 104 MS
QTC CALCULATION (BEZET), ECG08: 457 MS
RBC # BLD AUTO: 4.68 M/UL (ref 4.1–5.7)
RBC MORPH BLD: ABNORMAL
SODIUM SERPL-SCNC: 141 MMOL/L (ref 136–145)
SP1: NORMAL
SP2: NORMAL
SP3: NORMAL
VENTRICULAR RATE, ECG03: 90 BPM
WBC # BLD AUTO: 14.4 K/UL (ref 4.1–11.1)
WBC MORPH BLD: ABNORMAL

## 2021-03-08 PROCEDURE — 83615 LACTATE (LD) (LDH) ENZYME: CPT

## 2021-03-08 PROCEDURE — 80048 BASIC METABOLIC PNL TOTAL CA: CPT

## 2021-03-08 PROCEDURE — 74011250636 HC RX REV CODE- 250/636: Performed by: INTERNAL MEDICINE

## 2021-03-08 PROCEDURE — 96374 THER/PROPH/DIAG INJ IV PUSH: CPT

## 2021-03-08 PROCEDURE — 74011250637 HC RX REV CODE- 250/637: Performed by: STUDENT IN AN ORGANIZED HEALTH CARE EDUCATION/TRAINING PROGRAM

## 2021-03-08 PROCEDURE — 86140 C-REACTIVE PROTEIN: CPT

## 2021-03-08 PROCEDURE — 74011250636 HC RX REV CODE- 250/636: Performed by: STUDENT IN AN ORGANIZED HEALTH CARE EDUCATION/TRAINING PROGRAM

## 2021-03-08 PROCEDURE — 97161 PT EVAL LOW COMPLEX 20 MIN: CPT | Performed by: PHYSICAL THERAPIST

## 2021-03-08 PROCEDURE — 85025 COMPLETE CBC W/AUTO DIFF WBC: CPT

## 2021-03-08 PROCEDURE — 74011250637 HC RX REV CODE- 250/637: Performed by: INTERNAL MEDICINE

## 2021-03-08 PROCEDURE — 80076 HEPATIC FUNCTION PANEL: CPT

## 2021-03-08 PROCEDURE — 96372 THER/PROPH/DIAG INJ SC/IM: CPT

## 2021-03-08 PROCEDURE — 99218 HC RM OBSERVATION: CPT

## 2021-03-08 PROCEDURE — 93306 TTE W/DOPPLER COMPLETE: CPT

## 2021-03-08 PROCEDURE — 85379 FIBRIN DEGRADATION QUANT: CPT

## 2021-03-08 PROCEDURE — 36415 COLL VENOUS BLD VENIPUNCTURE: CPT

## 2021-03-08 RX ORDER — GUAIFENESIN 100 MG/5ML
100 SOLUTION ORAL
Qty: 1 BOTTLE | Refills: 0 | Status: SHIPPED | OUTPATIENT
Start: 2021-03-08

## 2021-03-08 RX ORDER — MORPHINE SULFATE 2 MG/ML
1 INJECTION, SOLUTION INTRAMUSCULAR; INTRAVENOUS ONCE
Status: COMPLETED | OUTPATIENT
Start: 2021-03-08 | End: 2021-03-08

## 2021-03-08 RX ORDER — BENZONATATE 100 MG/1
100 CAPSULE ORAL
Qty: 30 CAP | Refills: 0 | Status: SHIPPED | OUTPATIENT
Start: 2021-03-08 | End: 2021-03-18

## 2021-03-08 RX ORDER — LISINOPRIL 40 MG/1
40 TABLET ORAL DAILY
Qty: 30 TAB | Refills: 1 | Status: SHIPPED | OUTPATIENT
Start: 2021-03-09

## 2021-03-08 RX ORDER — POTASSIUM CHLORIDE 750 MG/1
40 TABLET, FILM COATED, EXTENDED RELEASE ORAL ONCE
Status: COMPLETED | OUTPATIENT
Start: 2021-03-08 | End: 2021-03-08

## 2021-03-08 RX ORDER — IPRATROPIUM BROMIDE AND ALBUTEROL SULFATE 2.5; .5 MG/3ML; MG/3ML
3 SOLUTION RESPIRATORY (INHALATION)
Status: DISCONTINUED | OUTPATIENT
Start: 2021-03-08 | End: 2021-03-08 | Stop reason: HOSPADM

## 2021-03-08 RX ORDER — LISINOPRIL 20 MG/1
40 TABLET ORAL DAILY
Status: DISCONTINUED | OUTPATIENT
Start: 2021-03-08 | End: 2021-03-08 | Stop reason: HOSPADM

## 2021-03-08 RX ADMIN — ACETAMINOPHEN 650 MG: 325 TABLET ORAL at 05:03

## 2021-03-08 RX ADMIN — BUTALBITAL, ACETAMINOPHEN, AND CAFFEINE 1 TABLET: 50; 325; 40 TABLET ORAL at 08:52

## 2021-03-08 RX ADMIN — LISINOPRIL 40 MG: 20 TABLET ORAL at 08:53

## 2021-03-08 RX ADMIN — ENOXAPARIN SODIUM 40 MG: 40 INJECTION SUBCUTANEOUS at 08:53

## 2021-03-08 RX ADMIN — MORPHINE SULFATE 1 MG: 2 INJECTION, SOLUTION INTRAMUSCULAR; INTRAVENOUS at 06:55

## 2021-03-08 RX ADMIN — Medication 10 ML: at 06:56

## 2021-03-08 RX ADMIN — POTASSIUM CHLORIDE 40 MEQ: 750 TABLET, FILM COATED, EXTENDED RELEASE ORAL at 08:52

## 2021-03-08 RX ADMIN — Medication 1 LOZENGE: at 12:29

## 2021-03-08 RX ADMIN — GUAIFENESIN 600 MG: 600 TABLET, EXTENDED RELEASE ORAL at 08:53

## 2021-03-08 NOTE — PROGRESS NOTES
Reason for Admission:  Per Dr. Antelmo Rodriguez:     Frankie Currie is a 32 y.o.  male with PMH of above mentioned problems who presents to ED with c/o fatigue, loss of appetite, dyspnea and weight loss for past 1 month. Patient is here under OBSERVATION status. Patient states that he has [employer]. Not noted on Face Sheet. Patient signed OBS letter- copy given to patient, noted on Document List and letter placed in chart. RUR Score:   9%                  Plan for utilizing home health:   No plans for [facility] at this time. PCP: First and Last name:  None     Name of Practice:    Are you a current patient: Yes/No:    Approximate date of last visit:    Can you participate in a virtual visit with your PCP:                     Current Advanced Directive/Advance Care Plan: Full Code  Patient stated that he would want CPR and ventilation. Healthcare Decision Maker is his father, Kiley Culver [phone]. Healthcare Decision Maker:   Click here to complete [address] including selection of the Healthcare Decision Maker Relationship (ie \"Primary\")                             Transition of Care Plan:  Met with patient for assessment. Lives with brother in an apartment. Recently moved here. Not employed at this time. Verified contact number is patient's cell. His father , Kiley Culver [phone] is his emergency contact. Patient drove to hospital and will be able to drive home at discharge. Patient wants to use [facility] pharmacy. CM talked with Dr. Kelsie Bermudez and patient will need PCP appointment and Nephrology appointment. Plan-  CM called [facility] and scheduled appointment for  3/30 at 8am.  This will be and in person appointment. CM called [facility] and left message for a return call to schedule an appointment.

## 2021-03-08 NOTE — PROGRESS NOTES
Tele cross cover   Pt has been hypertensive since arrival. Pt has had Lisinopril at 1913. Pt complains of headache not relieved by Tylenol.     Case was discussed with the nurse , Head ct was order  , Hydralazine PRN was order , Fioricet for headache was order

## 2021-03-08 NOTE — PROGRESS NOTES
Telemed: WBC's 14.4 and Calcium 8.3 on morning labs. pt complaining of pain rated 8-9 /10 in right flank. States worst he has had. Given 2 Tylenol with not much relief noted. CT of abd shows polycystic kidney. Is there anything I might give him for the pain> Would you like any repletion of labs outside reference range? Thank you    plan: WBC and calcium grossly unchanged from yesterday. UA ordered as well as potassium repletion orally, IV morphine once, renal US r/o radio-opaque stone, spoke with nurse.  Will defer subsequent recommendations to primary admitting service

## 2021-03-08 NOTE — PROGRESS NOTES
Pt admitted this evening for care. Pt making slow progress toward established goals and is in agreement with plan of care as established.    pts blood pressure elevated , pt admission assessment completed and review of care plan done at this time. Pt verbalized understanding,   A new connect request was successfully created for: Romulo Peñaloza : 1995 MRN: [id number] ConnectID: [id number] REASON: Abnormal Vital ACUITY: 30 Minutes SUBMITTED: 2021 21:09 EST  2135 Spoke with Dr. Rivers who stated he would order a stat CT for complaints of headache, hydralazine prn for elevated blood pressure with parameters and fioricet for headache prn.   2245 pt taken to CT and scanned. Returned to room via bed.   0300 pt resting in bed. No noted distress at this time.   0500 pt complaining of right flank and lower abd pain rated 8-9/10. Given 2 tylenol po per prn order  0608 pt complaining of pain in right flank, wbcs 14.4 and calcium 8.3 per morning labs. Notified physician. A new connect request was successfully created for: Romulo Peñaloza : 1995 MRN: [id number] ConnectID: [id number] REASON: Abnormal Lab ACUITY: 60 Minutes SUBMITTED: 2021 06:08 EST  0629 Spoke with Dr. Feng and received orders for pain medication x1 dose, renal ultrasound this am and no new orders regarding lab results.

## 2021-03-08 NOTE — DISCHARGE SUMMARY
Hospitalist Discharge Summary     Patient ID:  Adonay Daniel  [id number]  32 y.o.  1995    PCP on record: None    Admit date: 3/7/2021  Discharge date and time: 3/8/2021      Admission Diagnoses: COVID-19 [U07.1]  Lymphocytosis [D72.820]    Discharge Diagnoses: Active Problems:    Lymphocytosis (3/7/2021)      COVID-19 (3/7/2021)           Hospital Course:     # Infectious mononucleosis   # Lymphocytic leukocytosis d/t No.1   # Fatigue , anorexia d/t No.1 and sequela of COVID 19   Patient was diagnosed with COVID-19 infection 1 month ago.   HIV neg   Monospot test positive   RCP including COVID 19 PCR negative   CTA neg for PE/ opacities     -We will discharge patient on supportive management for infection mononucleosis  -Tylenol for myalgias   -  Discussed with patient that fatigue is a very common symptom of infectious mononucleosis and also a chronic sequela of COVID-19 it will take months up to 6 months to get back to his baseline  -No contact sports  -Update partner and recommend testing  -Follow with primary care in 1 to 2 weeks for repeat blood work including liver function tests and CBC for resolution of elevated liver function test and lymphocytic leukocytosis which is most likely secondary to infectious mononucleosis  - patient agreed and verbalized understanding     #APKD POA   #Hypertension POA   -Patient urinalysis was positive for proteinuria and blood  -Discharge home lisinopril 40 mg  - Further HTN mgmt per Nephrologist  - Low salt diet discussed /w patient   -Patient needs nephrology follow-up given he might be a candidate for tolvaptan  -Discussed with patient about importance of nephrology follow-up patient agreed and verbalized understanding    #Elevated liver enzymes resolving   -Hepatitis testing negative  -CT A/P s done:  CBD normal ,  gallbladder WNL   -Suspect secondary to  infectious mononucleosis recommend repeat testing in 1 to 2 weeks with primary care physician      #Sinus tachycardia POA resolved   -EKG reviewed sinus tach nondisplaced T wave changes  -Suspect secondary to infectious mono  -Troponin negative BNP 44  -Echo wnl     CONSULTATIONS:  IP CONSULT TO HOSPITALIST    Excerpted HPI from H&P of Breana Rosas MD:    Adriana Gray is a 32 y.o.  male with PMH of above mentioned problems who presents to ED with c/o fatigue, loss of appetite, dyspnea and weight loss for past 1 month. Patient states that he has COVID-19 infection earlier February and since then he been feeling weak short of breath on exertion and loss his appetite. Patient denied any fever but endorsed some chills. Patient stated that he moved from [place] to [place] and since then has been having those symptoms. Patient recently had a new sexual partner and is unaware of the HIV state of the partner but he said he has been monogamous. Patient denied any recent travels or any other states, any sick pets. Patient endorsed dry cough. Drinks alcohol occasionally denies any history of tobacco use or drug use     We were asked to admit for work up and evaluation of the above problems.      History reviewed. No pertinent past medical history. ______________________________________________________________________  DISCHARGE SUMMARY/HOSPITAL COURSE:  for full details see H&P, daily progress notes, labs, consult notes. _______________________________________________________________________  Patient seen and examined by me on discharge day. Pertinent Findings:  Gen:    Not in distress  Chest: Clear lungs  CVS:   Regular rhythm. No edema  Abd:  Soft, not distended, not tender  Neuro:  Alert with good insight.   Oriented to person, place, and time   _______________________________________________________________________  DISCHARGE MEDICATIONS:   Current Discharge Medication List      START taking these medications    Details   guaiFENesin (ROBITUSSIN) 100 mg/5 mL liquid Take 5 mL by mouth three (3) times daily as needed for Cough. Qty: 1 Bottle, Refills: 0      lisinopriL (PRINIVIL, ZESTRIL) 40 mg tablet Take 1 Tab by mouth daily. Qty: 30 Tab, Refills: 1      benzonatate (Tessalon Perles) 100 mg capsule Take 1 Cap by mouth three (3) times daily as needed for Cough for up to 10 days. Qty: 30 Cap, Refills: 0             My Recommended Diet, Activity, Wound Care, and follow-up labs are listed in the patient's Discharge Insturctions which I have personally completed and reviewed. _______________________________________________________________________  DISPOSITION:     Home with Family: x   Home with HH/PT/OT/RN:    SNF/LTC:    SHANA:    OTHER:        Condition at Discharge:  Stable  _______________________________________________________________________  Follow up with:   PCP : None  Follow-up Information     Follow up With Specialties Details Why EuniceKandice Nadine [facility] On 3/30/2021 Your appointment time is 8am.  This will be an in person appointment. There is a $50 co-pay., Bring ins card, picture id, and discharge papers, Please keep this appointment, Please arrive 15 minutes early. [address]  [phone]    [facility]  On 3/10/2021 Case Management has called office and left a message to return call. If you do not hear back about day/time for appointment by Wednesday, March 10 at 12 noon, please call office to schedule appointment.     [address], [address], [address] [place] [address] Ave    Phone: [phone]                                                                              Total time in minutes spent coordinating this discharge (includes going over instructions, follow-up, prescriptions, and preparing report for sign off to her PCP) :  35 minutes    Signed:  Chelita Whyte MD

## 2021-03-08 NOTE — PROGRESS NOTES
PHYSICAL THERAPY EVALUATION/DISCHARGE  Patient: Tavia Yang (32 y.o. male)  Date: 3/8/2021  Primary Diagnosis: COVID-19 [U07.1]  Lymphocytosis [D72.820]       Precautions:          ASSESSMENT  Based on the objective data described below, the patient presents with independent mobility. Patient independent with bed mobility, transfers, and ambulation. Patient ambulated 600 feet independently. No loss of balance or shortness of breath noted. Patient educated on pacing with activity with good understanding. Further skilled acute physical therapy is not indicated at this time. PLAN :  Recommendation for discharge: (in order for the patient to meet his/her long term goals)  No skilled physical therapy/ follow up rehabilitation needs identified at this time. IF patient discharges home will need the following DME: none       SUBJECTIVE:   Patient stated I feel okay.     OBJECTIVE DATA SUMMARY:   HISTORY:    History reviewed. No pertinent past medical history. No past surgical history on file. Home Situation  Home Environment: Apartment  # Steps to Enter: 2  One/Two Story Residence: One story  Living Alone: No  Support Systems: Family member(s)  Patient Expects to be Discharged to[de-identified] Apartment  Current DME Used/Available at Home: None    EXAMINATION/PRESENTATION/DECISION MAKING:   Critical Behavior:   Alert and oriented x4     Hearing: Auditory  Auditory Impairment: None    Range Of Motion:  AROM: Within functional limits  PROM: Within functional limits     Strength:    Strength:  Within functional limits        Tone & Sensation:   Tone: Normal  Sensation: Intact     Coordination:  Coordination: Within functional limits    Functional Mobility:  Bed Mobility:  Supine to Sit: Independent  Sit to Supine: Independent  Scooting: Independent     Transfers:  Sit to Stand: Independent  Stand to Sit: Independent    Balance:   Sitting: Intact  Standing: Intact     Ambulation/Gait Training:  Gait Description (WDL): Within defined limits   Patient ambulated 600 feet independently     Based on the above components, the patient evaluation is determined to be of the following complexity level: LOW     Pain Rating:  No pain    Activity Tolerance:   Good      After treatment patient left in no apparent distress:   Supine in bed and Call bell within reach    COMMUNICATION/EDUCATION:   The patients plan of care was discussed with: Registered nurse and Physician. Fall prevention education was provided and the patient/caregiver indicated understanding., Patient/family have participated as able in goal setting and plan of care. and Patient/family agree to work toward stated goals and plan of care.     Thank you for this referral.  Gabriela Stevens, PT   Time Calculation: 16 mins

## 2021-03-08 NOTE — DISCHARGE INSTRUCTIONS
- Please avoid contact sports for 4 weeks   - Please follow up nephrologist for Adult polycystic kidney disease   - we have started you on lisinopril 40 mg daily for  Hypertension, blood and protein loss in urine   - Fatigue from Mono takes a long time to settle down upto 6 months

## 2021-03-08 NOTE — PROGRESS NOTES
1910 Report received from [facility], [facility], results reviewed.

## 2021-03-08 NOTE — PROGRESS NOTES
Shift change report received from 2413 Prashant Salgado Rn.pt awake,resting on his bed.call bell in reach. 5016 pt assessed and received his scheduled medicine. 1010 pt off  Isolation. 1100 pt made aware about discharge order,awaiting on CM to schedule his appointment. Prescription medicine sent to [facility] out patient pharmacy,awaiting for to filled. 1150 pt ambulated in the hallway with PT.    1250 pt eating his lunch. 1500 CM still working on pt medicine to be filled. 1525 pt can drive himself to home as per Dr Sedrick Ortiz. 1550 AVS discharge instruction reviewed with pt.prescription medicine filled at [facility] out pt pharmacy. pt received written instruction regarding his medicine and follow-up appointment with verbal feedback. iv line removed. pt has all his belonging with him. pt ambulated to ED exit. pt left the building,driving himself home.

## 2021-03-10 ENCOUNTER — TELEPHONE (OUTPATIENT)
Dept: CASE MANAGEMENT | Age: 26
End: 2021-03-10

## 2021-03-10 LAB
B HENSELAE IGG TITR SER IF: NEGATIVE TITER
B HENSELAE IGM TITR SER IF: NEGATIVE TITER
B QUINTANA IGG TITR SER: NEGATIVE TITER
B QUINTANA IGM TITR SER: NEGATIVE TITER
BACTERIA SPEC CULT: NORMAL
SERVICE CMNT-IMP: NORMAL

## 2021-03-10 NOTE — TELEPHONE ENCOUNTER
Hospital follow up  CM follow up call    CM attempted to call patient today for a hospital admission follow up call. Patient did not answer. Have left a  with call back number of [phone]. Will re-attempt at a later time.      Freeman Huff RN/NICOLE  [phone]

## 2021-03-12 LAB
BACTERIA SPEC CULT: NORMAL
SERVICE CMNT-IMP: NORMAL

## 2021-03-16 ENCOUNTER — HOSPITAL ENCOUNTER (EMERGENCY)
Age: 26
Discharge: HOME | End: 2021-03-16
Attending: EMERGENCY MEDICINE
Payer: MEDICAID

## 2021-03-16 ENCOUNTER — APPOINTMENT (OUTPATIENT)
Dept: CT IMAGING | Age: 26
End: 2021-03-16
Attending: EMERGENCY MEDICINE
Payer: MEDICAID

## 2021-03-16 VITALS
DIASTOLIC BLOOD PRESSURE: 89 MMHG | HEIGHT: 70 IN | SYSTOLIC BLOOD PRESSURE: 125 MMHG | RESPIRATION RATE: 18 BRPM | WEIGHT: 170 LBS | TEMPERATURE: 98.5 F | BODY MASS INDEX: 24.34 KG/M2 | HEART RATE: 88 BPM | OXYGEN SATURATION: 98 %

## 2021-03-16 DIAGNOSIS — Q61.19 POLYCYSTIC KIDNEY DISEASE, CHILDHOOD TYPE (CPKD): ICD-10-CM

## 2021-03-16 DIAGNOSIS — R31.0 GROSS HEMATURIA: Primary | ICD-10-CM

## 2021-03-16 LAB
ALBUMIN SERPL-MCNC: 3.7 G/DL (ref 3.5–5)
ALBUMIN/GLOB SERPL: 0.9 {RATIO} (ref 1.1–2.2)
ALP SERPL-CCNC: 95 U/L (ref 45–117)
ALT SERPL-CCNC: 75 U/L (ref 12–78)
ANION GAP SERPL CALC-SCNC: 9 MMOL/L (ref 5–15)
APPEARANCE UR: ABNORMAL
AST SERPL-CCNC: 33 U/L (ref 15–37)
BACTERIA URNS QL MICRO: ABNORMAL /HPF
BASOPHILS # BLD: 0 K/UL (ref 0–0.1)
BASOPHILS NFR BLD: 0 % (ref 0–1)
BILIRUB SERPL-MCNC: 0.8 MG/DL (ref 0.2–1)
BILIRUB UR QL: NEGATIVE
BUN SERPL-MCNC: 16 MG/DL (ref 6–20)
BUN/CREAT SERPL: 16 (ref 12–20)
CALCIUM SERPL-MCNC: 9 MG/DL (ref 8.5–10.1)
CHLORIDE SERPL-SCNC: 101 MMOL/L (ref 97–108)
CO2 SERPL-SCNC: 30 MMOL/L (ref 21–32)
COLOR UR: ABNORMAL
CREAT SERPL-MCNC: 1 MG/DL (ref 0.7–1.3)
DIFFERENTIAL METHOD BLD: ABNORMAL
EOSINOPHIL # BLD: 0.1 K/UL (ref 0–0.4)
EOSINOPHIL NFR BLD: 1 % (ref 0–7)
EPITH CASTS URNS QL MICRO: ABNORMAL /LPF
ERYTHROCYTE [DISTWIDTH] IN BLOOD BY AUTOMATED COUNT: 13.3 % (ref 11.5–14.5)
GLOBULIN SER CALC-MCNC: 4 G/DL (ref 2–4)
GLUCOSE SERPL-MCNC: 93 MG/DL (ref 65–100)
GLUCOSE UR STRIP.AUTO-MCNC: NEGATIVE MG/DL
HCT VFR BLD AUTO: 42.9 % (ref 36.6–50.3)
HGB BLD-MCNC: 14.8 G/DL (ref 12.1–17)
HGB UR QL STRIP: ABNORMAL
IMM GRANULOCYTES # BLD AUTO: 0 K/UL
IMM GRANULOCYTES NFR BLD AUTO: 0 %
KETONES UR QL STRIP.AUTO: ABNORMAL MG/DL
LEUKOCYTE ESTERASE UR QL STRIP.AUTO: ABNORMAL
LIPASE SERPL-CCNC: 126 U/L (ref 73–393)
LYMPHOCYTES # BLD: 5 K/UL (ref 0.8–3.5)
LYMPHOCYTES NFR BLD: 64 % (ref 12–49)
MCH RBC QN AUTO: 30.5 PG (ref 26–34)
MCHC RBC AUTO-ENTMCNC: 34.5 G/DL (ref 30–36.5)
MCV RBC AUTO: 88.5 FL (ref 80–99)
MONOCYTES # BLD: 0.7 K/UL (ref 0–1)
MONOCYTES NFR BLD: 9 % (ref 5–13)
NEUTS SEG # BLD: 2.1 K/UL (ref 1.8–8)
NEUTS SEG NFR BLD: 26 % (ref 32–75)
NITRITE UR QL STRIP.AUTO: NEGATIVE
NRBC # BLD: 0 K/UL (ref 0–0.01)
NRBC BLD-RTO: 0 PER 100 WBC
PH UR STRIP: 5.5 [PH] (ref 5–8)
PLATELET # BLD AUTO: 527 K/UL (ref 150–400)
PMV BLD AUTO: 9.1 FL (ref 8.9–12.9)
POTASSIUM SERPL-SCNC: 4.4 MMOL/L (ref 3.5–5.1)
PROT SERPL-MCNC: 7.7 G/DL (ref 6.4–8.2)
PROT UR STRIP-MCNC: 100 MG/DL
RBC # BLD AUTO: 4.85 M/UL (ref 4.1–5.7)
RBC #/AREA URNS HPF: ABNORMAL /HPF (ref 0–5)
RBC MORPH BLD: ABNORMAL
SODIUM SERPL-SCNC: 140 MMOL/L (ref 136–145)
SP GR UR REFRACTOMETRY: 1.02 (ref 1–1.03)
UA: UC IF INDICATED,UAUC: ABNORMAL
UROBILINOGEN UR QL STRIP.AUTO: 1 EU/DL (ref 0.2–1)
WBC # BLD AUTO: 7.9 K/UL (ref 4.1–11.1)
WBC MORPH BLD: ABNORMAL
WBC URNS QL MICRO: ABNORMAL /HPF (ref 0–4)

## 2021-03-16 PROCEDURE — 36415 COLL VENOUS BLD VENIPUNCTURE: CPT

## 2021-03-16 PROCEDURE — 74011000636 HC RX REV CODE- 636: Performed by: EMERGENCY MEDICINE

## 2021-03-16 PROCEDURE — 83690 ASSAY OF LIPASE: CPT

## 2021-03-16 PROCEDURE — 99283 EMERGENCY DEPT VISIT LOW MDM: CPT

## 2021-03-16 PROCEDURE — 74177 CT ABD & PELVIS W/CONTRAST: CPT

## 2021-03-16 PROCEDURE — 80053 COMPREHEN METABOLIC PANEL: CPT

## 2021-03-16 PROCEDURE — 85025 COMPLETE CBC W/AUTO DIFF WBC: CPT

## 2021-03-16 PROCEDURE — 81001 URINALYSIS AUTO W/SCOPE: CPT

## 2021-03-16 RX ORDER — SODIUM CHLORIDE 0.9 % (FLUSH) 0.9 %
10 SYRINGE (ML) INJECTION
Status: COMPLETED | OUTPATIENT
Start: 2021-03-16 | End: 2021-03-16

## 2021-03-16 RX ADMIN — IOPAMIDOL 100 ML: 755 INJECTION, SOLUTION INTRAVENOUS at 02:58

## 2021-03-16 RX ADMIN — Medication 10 ML: at 02:58

## 2021-03-16 NOTE — DISCHARGE INSTRUCTIONS
You were evaluated in the emergency department for blood in urine. Your examination was reassuring as was your work-up including urinalysis, blood work, and CT scan. It will be important for you to follow-up with Nephrology as soon as possible, please keep calling to make an appointment. If you develop worsening symptoms such as fevers, abdominal pain, flank pain, worsening blood in urine, or inability to urinate with clots, please return to the emergency department immediately.

## 2021-03-16 NOTE — ED PROVIDER NOTES
EMERGENCY DEPARTMENT HISTORY AND PHYSICAL EXAM      Date: 3/16/2021  Patient Name: [facility]    History of Presenting Illness     Chief Complaint   Patient presents with    Blood in Urine       History Provided By: Patient    HPI: [facility], 32 y.o. male with history of childhood autosomal dominant PCKD presents to the ED with cc of gross hematuria. Symptoms started earlier today. He noticed that his urine was a dark brown color. About 1 hour prior to arrival he noted bright red hematuria x1 episode. This has resolved back to a dark brown urine color here in the ED. He denies passing any clots. Denies any pain with urination. Denies any penile discharge or concern for an STD. He has never had hematuria before. Of note patient was recently admitted 3/7/2021 to 3/8/2021 with infectious mononucleosis. He is working to schedule outpatient follow-up with nephrology. Denies any fevers, chills. He does endorse mild left flank and left upper quadrant pain. There are no other complaints, changes, or physical findings at this time. PCP: None    No current facility-administered medications on file prior to encounter. Current Outpatient Medications on File Prior to Encounter   Medication Sig Dispense Refill    guaiFENesin (ROBITUSSIN) 100 mg/5 mL liquid Take 5 mL by mouth three (3) times daily as needed for Cough. 1 Bottle 0    lisinopriL (PRINIVIL, ZESTRIL) 40 mg tablet Take 1 Tab by mouth daily. 30 Tab 1    benzonatate (Tessalon Perles) 100 mg capsule Take 1 Cap by mouth three (3) times daily as needed for Cough for up to 10 days. 30 Cap 0       Past History     Past Medical History:  History reviewed. No pertinent past medical history. Past Surgical History:  No past surgical history on file. Family History:  History reviewed. No pertinent family history.     Social History:  Social History     Tobacco Use    Smoking status: Not on file   Substance Use Topics    Alcohol use: Not on file    Drug use: Not on file       Allergies:  No Known Allergies      Review of Systems   Review of Systems   Constitutional: Negative for chills and fever. Respiratory: Negative for cough and shortness of breath. Cardiovascular: Negative for chest pain and leg swelling. Gastrointestinal: Positive for abdominal pain. Negative for nausea and vomiting. Genitourinary: Positive for flank pain and hematuria. Negative for dysuria, frequency and urgency. Musculoskeletal: Negative for back pain and gait problem. Skin: Negative for color change and rash. Neurological: Negative for dizziness, weakness, light-headedness and headaches. Hematological: Does not bruise/bleed easily. All other systems reviewed and are negative. Physical Exam   Physical Exam  Vitals signs and nursing note reviewed. Constitutional:       General: He is not in acute distress. Appearance: Normal appearance. He is not ill-appearing, toxic-appearing or diaphoretic. HENT:      Head: Normocephalic and atraumatic. Cardiovascular:      Rate and Rhythm: Normal rate and regular rhythm. Heart sounds: Normal heart sounds. No murmur. Pulmonary:      Effort: Pulmonary effort is normal. No respiratory distress. Breath sounds: Normal breath sounds. No wheezing. Abdominal:      Palpations: Abdomen is soft. Tenderness: There is no abdominal tenderness. There is no right CVA tenderness, left CVA tenderness, guarding or rebound. Skin:     General: Skin is warm and dry. Findings: No erythema or rash. Neurological:      General: No focal deficit present. Mental Status: He is alert and oriented to person, place, and time.          Diagnostic Study Results     Labs -     Recent Results (from the past 12 hour(s))   URINALYSIS W/ REFLEX CULTURE    Collection Time: 03/16/21 12:53 AM    Specimen: Urine   Result Value Ref Range    Color PERRY      Appearance TURBID (A) CLEAR      Specific gravity 1.020 1.003 - 1.030      pH (UA) 5.5 5.0 - 8.0      Protein 100 (A) NEG mg/dL    Glucose Negative NEG mg/dL    Ketone TRACE (A) NEG mg/dL    Bilirubin Negative NEG      Blood LARGE (A) NEG      Urobilinogen 1.0 0.2 - 1.0 EU/dL    Nitrites Negative NEG      Leukocyte Esterase SMALL (A) NEG      WBC 0-4 0 - 4 /hpf    RBC 20-50 0 - 5 /hpf    Epithelial cells FEW FEW /lpf    Bacteria 1+ (A) NEG /hpf    UA:UC IF INDICATED CULTURE NOT INDICATED BY UA RESULT CNI     CBC WITH AUTOMATED DIFF    Collection Time: 03/16/21  1:57 AM   Result Value Ref Range    WBC 7.9 4.1 - 11.1 K/uL    RBC 4.85 4.10 - 5.70 M/uL    HGB 14.8 12.1 - 17.0 g/dL    HCT 42.9 36.6 - 50.3 %    MCV 88.5 80.0 - 99.0 FL    MCH 30.5 26.0 - 34.0 PG    MCHC 34.5 30.0 - 36.5 g/dL    RDW 13.3 11.5 - 14.5 %    PLATELET 789 (H) 147 - 400 K/uL    MPV 9.1 8.9 - 12.9 FL    NRBC 0.0 0  WBC    ABSOLUTE NRBC 0.00 0.00 - 0.01 K/uL    NEUTROPHILS 26 (L) 32 - 75 %    LYMPHOCYTES 64 (H) 12 - 49 %    MONOCYTES 9 5 - 13 %    EOSINOPHILS 1 0 - 7 %    BASOPHILS 0 0 - 1 %    IMMATURE GRANULOCYTES 0 %    ABS. NEUTROPHILS 2.1 1.8 - 8.0 K/UL    ABS. LYMPHOCYTES 5.0 (H) 0.8 - 3.5 K/UL    ABS. MONOCYTES 0.7 0.0 - 1.0 K/UL    ABS. EOSINOPHILS 0.1 0.0 - 0.4 K/UL    ABS. BASOPHILS 0.0 0.0 - 0.1 K/UL    ABS. IMM.  GRANS. 0.0 K/UL    DF MANUAL      RBC COMMENTS NORMOCYTIC, NORMOCHROMIC      WBC COMMENTS REACTIVE LYMPHS     METABOLIC PANEL, COMPREHENSIVE    Collection Time: 03/16/21  1:57 AM   Result Value Ref Range    Sodium 140 136 - 145 mmol/L    Potassium 4.4 3.5 - 5.1 mmol/L    Chloride 101 97 - 108 mmol/L    CO2 30 21 - 32 mmol/L    Anion gap 9 5 - 15 mmol/L    Glucose 93 65 - 100 mg/dL    BUN 16 6 - 20 MG/DL    Creatinine 1.00 0.70 - 1.30 MG/DL    BUN/Creatinine ratio 16 12 - 20      GFR est AA >60 >60 ml/min/1.73m2    GFR est non-AA >60 >60 ml/min/1.73m2    Calcium 9.0 8.5 - 10.1 MG/DL    Bilirubin, total 0.8 0.2 - 1.0 MG/DL    ALT (SGPT) 75 12 - 78 U/L    AST (SGOT) 33 15 - 37 U/L Alk. phosphatase 95 45 - 117 U/L    Protein, total 7.7 6.4 - 8.2 g/dL    Albumin 3.7 3.5 - 5.0 g/dL    Globulin 4.0 2.0 - 4.0 g/dL    A-G Ratio 0.9 (L) 1.1 - 2.2     LIPASE    Collection Time: 03/16/21  1:57 AM   Result Value Ref Range    Lipase 126 73 - 393 U/L       Radiologic Studies -   CT ABD PELV W CONT   Final Result      1. Multiple bilateral renal cysts. 2. Right nephrolithiasis. CT Results  (Last 48 hours)               03/16/21 0258  CT ABD PELV W CONT Final result    Impression:      1. Multiple bilateral renal cysts. 2. Right nephrolithiasis. Narrative:  EXAM: CT ABD PELV W CONT       INDICATION: h/o AD PCKD, painless hematuria       COMPARISON: March 7        CONTRAST: 100 mL of Isovue-370. TECHNIQUE:    Following the uneventful intravenous administration of contrast, thin axial   images were obtained through the abdomen and pelvis. Coronal and sagittal   reconstructions were generated. Oral contrast was not administered. CT dose   reduction was achieved through use of a standardized protocol tailored for this   examination and automatic exposure control for dose modulation. FINDINGS:    LOWER THORAX: No significant abnormality in the incidentally imaged lower chest.   LIVER: Hepatic steatosis. BILIARY TREE: Gallbladder is within normal limits. CBD is not dilated. SPLEEN: within normal limits. PANCREAS: No mass or ductal dilatation. ADRENALS: Unremarkable. KIDNEYS: Enlarged kidneys with multiple bilateral renal cysts. Right   nephrolithiasis. STOMACH: Unremarkable. SMALL BOWEL: No dilatation or wall thickening. COLON: No dilatation or wall thickening. APPENDIX: Normal.   PERITONEUM: No ascites or pneumoperitoneum. RETROPERITONEUM: No lymphadenopathy or aortic aneurysm. REPRODUCTIVE ORGANS: Normal.   URINARY BLADDER: No mass or calculus. BONES: No destructive bone lesion. ABDOMINAL WALL: No mass or hernia.    ADDITIONAL COMMENTS: N/A CXR Results  (Last 48 hours)    None          Medical Decision Making   I am the first provider for this patient. I reviewed the vital signs, available nursing notes, past medical history, past surgical history, family history and social history. Vital Signs-Reviewed the patient's vital signs. Patient Vitals for the past 12 hrs:   Temp Pulse Resp BP SpO2   03/16/21 0231 -- 88 -- 125/89 98 %   03/16/21 0045 98.5 °F (36.9 °C) (!) 104 18 (!) 151/105 98 %       Records Reviewed: Nursing Notes, Old Medical Records, Previous Radiology Studies and Previous Laboratory Studies  I personally reviewed discharge summary records from 3/8/2021. Provider Notes (Medical Decision Making):   22-year-old male here with gross hematuria. On examination he is in no acute distress. He is afebrile and vital signs are stable. Urine is dark-colored here in the ED, no sign of infection but there is presence of protein as well as significant RBCs. This appears consistent with prior proteinuria on urinalysis about a week ago. I wonder if patient is having nephrotic syndrome secondary to recent infectious mononucleosis infection. Albumin 3.7 and there is no sign of dehydration or malnourishment. Creatinine within normal limits at 1.0. No significant leukocytosis. CT with contrast was performed which does not show any acute pathology to explain patient's hematuria, there is no sign of ureteral stone or active bleed on CT scan. Patient is strongly encouraged to continue following up with nephrology. He was given strict return ED precautions, all questions answered and he agrees with plan as above. ED Course:   Initial assessment performed. The patients presenting problems have been discussed, and they are in agreement with the care plan formulated and outlined with them. I have encouraged them to ask questions as they arise throughout their visit.          Discharge Note:  The patient has been re-evaluated and is ready for discharge. Reviewed available results with patient. Counseled patient on diagnosis and care plan. Patient has expressed understanding, and all questions have been answered. Patient agrees with plan and agrees to follow up as recommended, or to return to the ED if their symptoms worsen. Discharge instructions have been provided and explained to the patient, along with reasons to return to the ED. Disposition:  Discharge      DISCHARGE PLAN:  1. Discharge Medication List as of 3/16/2021  4:12 AM        2. Follow-up Information     Follow up With Specialties Details Why Contact Info    Your Nephrologist  Schedule an appointment as soon as possible for a visit       [facility] EMERGENCY DEPT Emergency Medicine Go to  As needed, If symptoms worsen [address]        3. Return to ED if worse     Diagnosis     Clinical Impression:   1. Gross hematuria    2. Polycystic kidney disease, childhood type (CPKD)        Attestations:  I am the first and primary provider of record for this patient's ED encounter. I personally performed the services described above in this documentation. Richar Trinh MD    Please note that this dictation was completed with [employer], the [employer] voice recognition software. Quite often unanticipated grammatical, syntax, homophones, and other interpretive errors are inadvertently transcribed by the computer software. Please disregard these errors. Please excuse any errors that have escaped final proofreading. Thank you.

## 2021-03-16 NOTE — ED NOTES
Patient c/o blood in urine. States he was seen here last week for COVID symptoms not resolving but was told last week that he had blood in his urine. He states that he has been trying to get in touch with urology for appointment but unable to see urology and felt like there was more blood in urine today. No fever. No n/v. No pain. Emergency Department Nursing Plan of Care       The Nursing Plan of Care is developed from the Nursing assessment and Emergency Department Attending provider initial evaluation. The plan of care may be reviewed in the ED Provider note.     The Plan of Care was developed with the following considerations:   Patient / Family readiness to learn indicated by:verbalized understanding  Persons(s) to be included in education: patient  Barriers to Learning/Limitations:No    Signed     Parisa Kendall [facility]    3/16/2021   2:35 AM

## 2021-03-16 NOTE — ED TRIAGE NOTES
Patient reports being COVID + about a month ago. Was diagnosed with MONO last week and noticed blood in his urine today. Waiting for appt from nephrology. Patient reports dull left flank pain.

## 2022-05-26 ENCOUNTER — APPOINTMENT (OUTPATIENT)
Dept: CT IMAGING | Age: 27
End: 2022-05-26
Attending: PHYSICIAN ASSISTANT
Payer: COMMERCIAL

## 2022-05-26 ENCOUNTER — HOSPITAL ENCOUNTER (EMERGENCY)
Age: 27
Discharge: HOME | End: 2022-05-26
Attending: EMERGENCY MEDICINE
Payer: COMMERCIAL

## 2022-05-26 VITALS
WEIGHT: 180 LBS | RESPIRATION RATE: 16 BRPM | HEART RATE: 72 BPM | HEIGHT: 70 IN | TEMPERATURE: 98.3 F | BODY MASS INDEX: 25.77 KG/M2 | DIASTOLIC BLOOD PRESSURE: 107 MMHG | SYSTOLIC BLOOD PRESSURE: 152 MMHG | OXYGEN SATURATION: 99 %

## 2022-05-26 DIAGNOSIS — M54.50 PAIN IN RIGHT LUMBAR REGION OF BACK: ICD-10-CM

## 2022-05-26 DIAGNOSIS — R10.31 RLQ ABDOMINAL PAIN: Primary | ICD-10-CM

## 2022-05-26 DIAGNOSIS — I10 HYPERTENSION, UNSPECIFIED TYPE: ICD-10-CM

## 2022-05-26 DIAGNOSIS — Q61.3 POLYCYSTIC KIDNEY DISEASE: ICD-10-CM

## 2022-05-26 LAB
ALBUMIN SERPL-MCNC: 4 G/DL (ref 3.5–5)
ALBUMIN/GLOB SERPL: 1.1 {RATIO} (ref 1.1–2.2)
ALP SERPL-CCNC: 49 U/L (ref 45–117)
ALT SERPL-CCNC: 32 U/L (ref 12–78)
ANION GAP SERPL CALC-SCNC: 8 MMOL/L (ref 5–15)
APPEARANCE UR: CLEAR
AST SERPL-CCNC: 14 U/L (ref 15–37)
BACTERIA URNS QL MICRO: NEGATIVE /HPF
BASOPHILS # BLD: 0 K/UL (ref 0–0.1)
BASOPHILS NFR BLD: 0 % (ref 0–1)
BILIRUB SERPL-MCNC: 0.9 MG/DL (ref 0.2–1)
BILIRUB UR QL: NEGATIVE
BUN SERPL-MCNC: 10 MG/DL (ref 6–20)
BUN/CREAT SERPL: 9 (ref 12–20)
CALCIUM SERPL-MCNC: 9 MG/DL (ref 8.5–10.1)
CHLORIDE SERPL-SCNC: 102 MMOL/L (ref 97–108)
CO2 SERPL-SCNC: 29 MMOL/L (ref 21–32)
COLOR UR: ABNORMAL
CREAT SERPL-MCNC: 1.17 MG/DL (ref 0.7–1.3)
DIFFERENTIAL METHOD BLD: ABNORMAL
EOSINOPHIL # BLD: 0.1 K/UL (ref 0–0.4)
EOSINOPHIL NFR BLD: 1 % (ref 0–7)
EPITH CASTS URNS QL MICRO: ABNORMAL /LPF
ERYTHROCYTE [DISTWIDTH] IN BLOOD BY AUTOMATED COUNT: 12.6 % (ref 11.5–14.5)
GLOBULIN SER CALC-MCNC: 3.5 G/DL (ref 2–4)
GLUCOSE SERPL-MCNC: 92 MG/DL (ref 65–100)
GLUCOSE UR STRIP.AUTO-MCNC: NEGATIVE MG/DL
HCT VFR BLD AUTO: 43.8 % (ref 36.6–50.3)
HGB BLD-MCNC: 15.2 G/DL (ref 12.1–17)
HGB UR QL STRIP: ABNORMAL
IMM GRANULOCYTES # BLD AUTO: 0 K/UL (ref 0–0.04)
IMM GRANULOCYTES NFR BLD AUTO: 0 % (ref 0–0.5)
KETONES UR QL STRIP.AUTO: NEGATIVE MG/DL
LEUKOCYTE ESTERASE UR QL STRIP.AUTO: NEGATIVE
LYMPHOCYTES # BLD: 2.1 K/UL (ref 0.8–3.5)
LYMPHOCYTES NFR BLD: 20 % (ref 12–49)
MCH RBC QN AUTO: 30.4 PG (ref 26–34)
MCHC RBC AUTO-ENTMCNC: 34.7 G/DL (ref 30–36.5)
MCV RBC AUTO: 87.6 FL (ref 80–99)
MONOCYTES # BLD: 1 K/UL (ref 0–1)
MONOCYTES NFR BLD: 9 % (ref 5–13)
NEUTS SEG # BLD: 7.2 K/UL (ref 1.8–8)
NEUTS SEG NFR BLD: 70 % (ref 32–75)
NITRITE UR QL STRIP.AUTO: NEGATIVE
NRBC # BLD: 0 K/UL (ref 0–0.01)
NRBC BLD-RTO: 0 PER 100 WBC
PH UR STRIP: 5 [PH] (ref 5–8)
PLATELET # BLD AUTO: 476 K/UL (ref 150–400)
PMV BLD AUTO: 9.2 FL (ref 8.9–12.9)
POTASSIUM SERPL-SCNC: 4.2 MMOL/L (ref 3.5–5.1)
PROT SERPL-MCNC: 7.5 G/DL (ref 6.4–8.2)
PROT UR STRIP-MCNC: 30 MG/DL
RBC # BLD AUTO: 5 M/UL (ref 4.1–5.7)
RBC #/AREA URNS HPF: ABNORMAL /HPF (ref 0–5)
SODIUM SERPL-SCNC: 139 MMOL/L (ref 136–145)
SP GR UR REFRACTOMETRY: 1.01
UA: UC IF INDICATED,UAUC: ABNORMAL
UROBILINOGEN UR QL STRIP.AUTO: 0.2 EU/DL (ref 0.2–1)
WBC # BLD AUTO: 10.5 K/UL (ref 4.1–11.1)
WBC URNS QL MICRO: ABNORMAL /HPF (ref 0–4)

## 2022-05-26 PROCEDURE — 74011250636 HC RX REV CODE- 250/636: Performed by: PHYSICIAN ASSISTANT

## 2022-05-26 PROCEDURE — 74177 CT ABD & PELVIS W/CONTRAST: CPT

## 2022-05-26 PROCEDURE — 85025 COMPLETE CBC W/AUTO DIFF WBC: CPT

## 2022-05-26 PROCEDURE — 81001 URINALYSIS AUTO W/SCOPE: CPT

## 2022-05-26 PROCEDURE — 96374 THER/PROPH/DIAG INJ IV PUSH: CPT

## 2022-05-26 PROCEDURE — 74011000636 HC RX REV CODE- 636: Performed by: EMERGENCY MEDICINE

## 2022-05-26 PROCEDURE — 80053 COMPREHEN METABOLIC PANEL: CPT

## 2022-05-26 PROCEDURE — 36415 COLL VENOUS BLD VENIPUNCTURE: CPT

## 2022-05-26 PROCEDURE — 96375 TX/PRO/DX INJ NEW DRUG ADDON: CPT

## 2022-05-26 PROCEDURE — 99285 EMERGENCY DEPT VISIT HI MDM: CPT

## 2022-05-26 RX ORDER — KETOROLAC TROMETHAMINE 30 MG/ML
15 INJECTION, SOLUTION INTRAMUSCULAR; INTRAVENOUS ONCE
Status: COMPLETED | OUTPATIENT
Start: 2022-05-26 | End: 2022-05-26

## 2022-05-26 RX ORDER — LISINOPRIL 40 MG/1
40 TABLET ORAL DAILY
Qty: 30 TABLET | Refills: 0 | Status: SHIPPED | OUTPATIENT
Start: 2022-05-26 | End: 2022-06-25

## 2022-05-26 RX ORDER — ONDANSETRON 2 MG/ML
4 INJECTION INTRAMUSCULAR; INTRAVENOUS ONCE
Status: COMPLETED | OUTPATIENT
Start: 2022-05-26 | End: 2022-05-26

## 2022-05-26 RX ORDER — ONDANSETRON 4 MG/1
4 TABLET, FILM COATED ORAL
Qty: 20 TABLET | Refills: 0 | Status: SHIPPED | OUTPATIENT
Start: 2022-05-26

## 2022-05-26 RX ADMIN — KETOROLAC TROMETHAMINE 15 MG: 30 INJECTION, SOLUTION INTRAMUSCULAR; INTRAVENOUS at 12:13

## 2022-05-26 RX ADMIN — ONDANSETRON 4 MG: 2 INJECTION INTRAMUSCULAR; INTRAVENOUS at 12:13

## 2022-05-26 RX ADMIN — IOPAMIDOL 100 ML: 755 INJECTION, SOLUTION INTRAVENOUS at 13:06

## 2022-05-26 RX ADMIN — SODIUM CHLORIDE 1000 ML: 9 INJECTION, SOLUTION INTRAVENOUS at 12:13

## 2022-05-26 NOTE — ED TRIAGE NOTES
Pt presents with right sided flank pain x 4 days with associated nausea this am. Pt has history of polycystic kidney disease and feels it's a possible kidney stone.

## 2022-05-26 NOTE — ED NOTES
Patient (s) given copy of dc instructions and 0 paper script(s) and 2 electronic scripts. Patient (s)  verbalized understanding of instructions and script (s). Patient given a current medication reconciliation form and verbalized understanding of their medications. Patient (s) verbalized understanding of the importance of discussing medications with  his or her physician or clinic they will be following up with. Patient alert and oriented and in no acute distress. Patient offered wheelchair from treatment area to hospital entrance, patient declined wheelchair.

## 2022-05-26 NOTE — Clinical Note
[facility] EMERGENCY DEPT  [address] [id number] [phone]    Work/School Note    Date: 5/26/2022    To Whom It May concern:    Gloria Prado was seen and treated today in the emergency room by the following provider(s):  Attending Provider: Devang Mayberry MD  Physician Assistant: Cecil Mitchell, [address]. Gloria Prado is excused from work/school on 05/26/22 and 05/27/22. He is medically clear to return to work/school on 5/28/2022.        Sincerely,          Jesus Whipple, [address]

## 2022-05-26 NOTE — ED PROVIDER NOTES
EMERGENCY DEPARTMENT HISTORY AND PHYSICAL EXAM      Date: 5/26/2022  Patient Name: [facility]    History of Presenting Illness     Chief Complaint   Patient presents with    Flank Pain     right side       History Provided By: Patient    HPI: [facility], 32 y.o. male with PMHx of polycystic kidney disease, HTN, presents to the ED with concern for a kidney stone. He reports dull constant aching pain in the right lower back that is been present for 4 days. Over the last 2 days he has noticed associated nausea and pain in the right lower quadrant of his abdomen. Nothing in particular makes the pain worse. He denies dysuria, urinary urgency or frequency, gross hematuria, nausea/vomiting, fever, diarrhea, constipation, blood in stool. He reports having a kidney stone once in childhood and is unsure if this is a similar sensation. He states that despite his polycystic kidney disease his creatinine has always been WNL. He reports irregular compliance with his blood pressure medication and has not seen his PCP or nephrologist in over a year. There are no other complaints, changes, or physical findings at this time. PCP: None    No current facility-administered medications on file prior to encounter. Current Outpatient Medications on File Prior to Encounter   Medication Sig Dispense Refill    guaiFENesin (ROBITUSSIN) 100 mg/5 mL liquid Take 5 mL by mouth three (3) times daily as needed for Cough. 1 Bottle 0    lisinopriL (PRINIVIL, ZESTRIL) 40 mg tablet Take 1 Tab by mouth daily. 30 Tab 1       Past History     Past Medical History:  Past Medical History:   Diagnosis Date    Ill-defined condition     polycystic kidney disease       Past Surgical History:  History reviewed. No pertinent surgical history. Family History:  History reviewed. No pertinent family history.     Social History:  Social History     Tobacco Use    Smoking status: Never Smoker    Smokeless tobacco: Never Used   Vaping Use    Vaping Use: Never used   Substance Use Topics    Alcohol use: Never    Drug use: Never       Allergies: Allergies   Allergen Reactions    Clindamycin Rash         Review of Systems   Review of Systems   Constitutional: Negative for chills, diaphoresis and fever. HENT: Negative for voice change. Eyes: Negative for redness. Respiratory: Negative for shortness of breath. Cardiovascular: Negative for chest pain. Gastrointestinal: Positive for abdominal pain and nausea. Negative for blood in stool, constipation, diarrhea and vomiting. Genitourinary: Negative for dysuria, frequency, hematuria and urgency. Polycystic kidney disease   Musculoskeletal: Positive for back pain. Skin: Negative for rash. Allergic/Immunologic: Negative for immunocompromised state. Neurological: Negative for dizziness. Hematological: Does not bruise/bleed easily. Psychiatric/Behavioral: Negative for agitation. Physical Exam   Physical Exam  Vitals and nursing note reviewed. Constitutional:       General: He is not in acute distress. Appearance: Normal appearance. He is not toxic-appearing. HENT:      Head: Normocephalic and atraumatic. Nose: Nose normal.      Mouth/Throat:      Mouth: Mucous membranes are moist.   Eyes:      Extraocular Movements: Extraocular movements intact. Conjunctiva/sclera: Conjunctivae normal.   Neck:      Trachea: Phonation normal.   Cardiovascular:      Rate and Rhythm: Normal rate and regular rhythm. Pulmonary:      Effort: Pulmonary effort is normal.      Breath sounds: Normal breath sounds. Abdominal:      General: There is no distension. Palpations: Abdomen is soft. Tenderness: There is no right CVA tenderness, left CVA tenderness, guarding or rebound. Comments: Mildly TTP in the right lower quadrant. Negative McBurney point tenderness. Musculoskeletal:         General: Normal range of motion.       Cervical back: Normal range of motion and neck supple. Comments: Subjective pain reported in the right lumbar region. 5/5 strength and sensation b/l UE/LEs. Gait is steady and symmetrical.   Skin:     General: Skin is warm and dry. Neurological:      General: No focal deficit present. Mental Status: He is alert and oriented to person, place, and time. GCS: GCS eye subscore is 4. GCS verbal subscore is 5. GCS motor subscore is 6. Psychiatric:         Mood and Affect: Mood normal.         Behavior: Behavior normal.         Diagnostic Study Results     Labs -     Recent Results (from the past 12 hour(s))   CBC WITH AUTOMATED DIFF    Collection Time: 05/26/22 11:55 AM   Result Value Ref Range    WBC 10.5 4.1 - 11.1 K/uL    RBC 5.00 4. 10 - 5.70 M/uL    HGB 15.2 12.1 - 17.0 g/dL    HCT 43.8 36.6 - 50.3 %    MCV 87.6 80.0 - 99.0 FL    MCH 30.4 26.0 - 34.0 PG    MCHC 34.7 30.0 - 36.5 g/dL    RDW 12.6 11.5 - 14.5 %    PLATELET 040 (H) 862 - 400 K/uL    MPV 9.2 8.9 - 12.9 FL    NRBC 0.0 0  WBC    ABSOLUTE NRBC 0.00 0.00 - 0.01 K/uL    NEUTROPHILS 70 32 - 75 %    LYMPHOCYTES 20 12 - 49 %    MONOCYTES 9 5 - 13 %    EOSINOPHILS 1 0 - 7 %    BASOPHILS 0 0 - 1 %    IMMATURE GRANULOCYTES 0 0.0 - 0.5 %    ABS. NEUTROPHILS 7.2 1.8 - 8.0 K/UL    ABS. LYMPHOCYTES 2.1 0.8 - 3.5 K/UL    ABS. MONOCYTES 1.0 0.0 - 1.0 K/UL    ABS. EOSINOPHILS 0.1 0.0 - 0.4 K/UL    ABS. BASOPHILS 0.0 0.0 - 0.1 K/UL    ABS. IMM.  GRANS. 0.0 0.00 - 0.04 K/UL    DF AUTOMATED     METABOLIC PANEL, COMPREHENSIVE    Collection Time: 05/26/22 11:55 AM   Result Value Ref Range    Sodium 139 136 - 145 mmol/L    Potassium 4.2 3.5 - 5.1 mmol/L    Chloride 102 97 - 108 mmol/L    CO2 29 21 - 32 mmol/L    Anion gap 8 5 - 15 mmol/L    Glucose 92 65 - 100 mg/dL    BUN 10 6 - 20 MG/DL    Creatinine 1.17 0.70 - 1.30 MG/DL    BUN/Creatinine ratio 9 (L) 12 - 20      GFR est AA >60 >60 ml/min/1.73m2    GFR est non-AA >60 >60 ml/min/1.73m2    Calcium 9.0 8.5 - 10.1 MG/DL Bilirubin, total 0.9 0.2 - 1.0 MG/DL    ALT (SGPT) 32 12 - 78 U/L    AST (SGOT) 14 (L) 15 - 37 U/L    Alk. phosphatase 49 45 - 117 U/L    Protein, total 7.5 6.4 - 8.2 g/dL    Albumin 4.0 3.5 - 5.0 g/dL    Globulin 3.5 2.0 - 4.0 g/dL    A-G Ratio 1.1 1.1 - 2.2     URINALYSIS W/ REFLEX CULTURE    Collection Time: 05/26/22 11:55 AM    Specimen: Urine   Result Value Ref Range    Color YELLOW/STRAW      Appearance CLEAR CLEAR      Specific gravity 1.015      pH (UA) 5.0 5.0 - 8.0      Protein 30 (A) NEG mg/dL    Glucose Negative NEG mg/dL    Ketone Negative NEG mg/dL    Bilirubin Negative NEG      Blood MODERATE (A) NEG      Urobilinogen 0.2 0.2 - 1.0 EU/dL    Nitrites Negative NEG      Leukocyte Esterase Negative NEG      WBC 0-4 0 - 4 /hpf    RBC 5-10 0 - 5 /hpf    Epithelial cells FEW FEW /lpf    Bacteria Negative NEG /hpf    UA:UC IF INDICATED CULTURE NOT INDICATED BY UA RESULT CNI         Radiologic Studies -   CT ABD PELV W CONT   Final Result   Normal appendix. CT Results  (Last 48 hours)               05/26/22 1306  CT ABD PELV W CONT Final result    Impression:  Normal appendix. Narrative:  INDICATION: Right flank pain and right lower quadrant abdominal pain for 3 days,   nausea, polycystic kidney disease. CT of the abdomen and pelvis is performed with 5 mm collimation. Study is   performed with 100 cc of nonionic Isovue 370. Sagittal and coronal reformatted   images were also performed. CT dose reduction was achieved with the use of the standardized protocol   tailored for this examination and automatic exposure control for dose   modulation. Direct comparison is made to prior CT dated March 2021. Findings:       Lung bases: The visualized lung bases are clear. Liver: The liver is stable. Adrenals: Adrenal glands are normal.       Pancreas: The pancreas is normal.       Gallbladder: The gallbladder is normal.       Kidneys:  There are innumerable bilateral renal cysts, several of which are   hyperdense, consistent with polycystic kidney disease. There is no   hydronephrosis. Nonobstructing bilateral nephrolithiasis is noted. Spleen: The spleen is normal.       Lymph nodes. There is no marck hepatitis, mesenteric, retroperitoneal or pelvic   lymphadenopathy. Bowel: No thickened or dilated loop of large or small bowel is visualized. Appendix: The appendix is normal.       Urinary bladder: Urinary bladder is partially filled and grossly normal.       Miscellaneous: There is no free intraperitoneal fluid or gas. There is no focal   fluid collection to suggest abscess. CXR Results  (Last 48 hours)    None            Medical Decision Making   I am the first provider for this patient. I reviewed the vital signs, available nursing notes, past medical history, past surgical history, family history and social history. Vital Signs-Reviewed the patient's vital signs. Patient Vitals for the past 12 hrs:   Temp Pulse Resp BP SpO2   05/26/22 1041 98.3 °F (36.8 °C) 72 16 (!) 152/107 99 %       Records Reviewed: Nursing Notes, Old Medical Records and Previous Laboratory Studies    Provider Notes (Medical Decision Making):   Clinical picture not overly convincing for kidney stone, however patient does have polycystic kidney disease and history of stones, as well as right lower quadrant tenderness so we will proceed with labs and CT. The patient is in agreement this plan. CT shows no acute obstructing stone or hydronephrosis. No evidence of appendicitis. The patient is agreeable to discharge with plans for Tylenol and as needed Zofran. He also requests a refill of his hypertension medication. I encouraged him to follow-up with a PCP for continuation of care. ED return precautions were reviewed. The patient is in agreement this plan. ED Course:   Initial assessment performed.  The patients presenting problems have been discussed, and they are in agreement with the care plan formulated and outlined with them. I have encouraged them to ask questions as they arise throughout their visit. Critical Care Time: None    Disposition:  dc    PLAN:  1. Discharge Medication List as of 5/26/2022  1:59 PM      START taking these medications    Details   ondansetron hcl (Zofran) 4 mg tablet Take 1 Tablet by mouth every eight (8) hours as needed for Nausea or Vomiting., Normal, Disp-20 Tablet, R-0      !! lisinopriL (PRINIVIL, ZESTRIL) 40 mg tablet Take 1 Tablet by mouth daily for 30 days. , Normal, Disp-30 Tablet, R-0       !! - Potential duplicate medications found. Please discuss with provider. CONTINUE these medications which have NOT CHANGED    Details   guaiFENesin (ROBITUSSIN) 100 mg/5 mL liquid Take 5 mL by mouth three (3) times daily as needed for Cough., Normal, Disp-1 Bottle, R-0      !! lisinopriL (PRINIVIL, ZESTRIL) 40 mg tablet Take 1 Tab by mouth daily. , Normal, Disp-30 Tab, R-1       !! - Potential duplicate medications found. Please discuss with provider. 2.   Follow-up Information     Follow up With Specialties Details Why [address]    [address] [place] [address] EMERGENCY DEPT Emergency Medicine  As needed, If symptoms worsen [address] Councilman., NP Nurse Practitioner Call  For follow up [place] [address] [address] [place]  [phone]          Return to ED if worse     Diagnosis     Clinical Impression:   1. RLQ abdominal pain    2. Pain in right lumbar region of back    3. Polycystic kidney disease    4. Hypertension, unspecified type          Please note that this dictation was completed with [employer], the [employer] voice recognition software. Quite often unanticipated grammatical, syntax, homophones, and other interpretive errors are inadvertently transcribed by the computer software. Please disregards these errors.  Please excuse any errors that have escaped final proofreading.